# Patient Record
Sex: FEMALE | Race: WHITE | NOT HISPANIC OR LATINO | Employment: FULL TIME | URBAN - NONMETROPOLITAN AREA
[De-identification: names, ages, dates, MRNs, and addresses within clinical notes are randomized per-mention and may not be internally consistent; named-entity substitution may affect disease eponyms.]

---

## 2019-05-30 ENCOUNTER — TRANSCRIBE ORDERS (OUTPATIENT)
Dept: ADMINISTRATIVE | Facility: HOSPITAL | Age: 34
End: 2019-05-30

## 2019-05-30 DIAGNOSIS — R00.2 PALPITATIONS: ICD-10-CM

## 2019-05-30 DIAGNOSIS — Z82.41 FAMILY HISTORY OF SUDDEN CARDIAC DEATH (SCD): Primary | ICD-10-CM

## 2020-06-09 ENCOUNTER — TELEPHONE (OUTPATIENT)
Dept: OTOLARYNGOLOGY | Facility: CLINIC | Age: 35
End: 2020-06-09

## 2020-07-16 ENCOUNTER — TRANSCRIBE ORDERS (OUTPATIENT)
Dept: ADMINISTRATIVE | Facility: HOSPITAL | Age: 35
End: 2020-07-16

## 2020-07-16 DIAGNOSIS — Z01.818 PREOP TESTING: Primary | ICD-10-CM

## 2020-08-26 ENCOUNTER — TRANSCRIBE ORDERS (OUTPATIENT)
Dept: ADMINISTRATIVE | Facility: HOSPITAL | Age: 35
End: 2020-08-26

## 2020-08-28 ENCOUNTER — OFFICE VISIT (OUTPATIENT)
Age: 35
End: 2020-08-28

## 2020-08-28 VITALS — OXYGEN SATURATION: 98 % | HEART RATE: 109 BPM | TEMPERATURE: 97.4 F

## 2020-08-31 LAB — SARS-COV-2, NAA: NOT DETECTED

## 2020-09-02 NOTE — PROGRESS NOTES
YOB: 1985  Location: [facility]  Location Address: [address] [place] [address], [facility], [address] [place] [address]  Location Phone: [phone]    Chief Complaint   Patient presents with   • Breathing Difficulty     trouble breathing out her nose       History of Present Illness  Katie France is a 34 y.o. female.  Katie France is here for evaluation of ENT complaints. The patient has had problems with fatigue, nasal congestion and sinus pressure, snoring  The symptoms are localized to the bilateral nose. The patient has had moderate to severe symptoms. The symptoms have been present for the last several years The symptoms are aggravated by  no identifiable factors. The symptoms are improved by no identifiable factors.    The patient has tried nasal sprays in the past to include Flonase and astelin without any improvement of symptoms.       Past Medical History:   Diagnosis Date   • Anxiety    • Breathing difficult    • Headache    • Migraines    • Snoring        Past Surgical History:   Procedure Laterality Date   • CHOLECYSTECTOMY         Outpatient Medications Marked as Taking for the 9/3/20 encounter (Office Visit) with Ahmet Kasper MD   Medication Sig Dispense Refill   • buPROPion (WELLBUTRIN) 75 MG tablet Take 75 mg by mouth 2 (Two) Times a Day.         Amoxicillin    Family History   Problem Relation Age of Onset   • Heart attack Mother    • Hypertension Mother    • Heart attack Father        Social History     Socioeconomic History   • Marital status:      Spouse name: Not on file   • Number of children: Not on file   • Years of education: Not on file   • Highest education level: Not on file   Tobacco Use   • Smoking status: Never Smoker   • Smokeless tobacco: Never Used   Substance and Sexual Activity   • Alcohol use: No   • Drug use: No       Review of Systems   Constitutional: Positive for fatigue. Negative for activity change, appetite change, chills, diaphoresis, fever and  unexpected weight change.   HENT: Positive for congestion. Negative for dental problem, drooling, ear discharge, ear pain, facial swelling, hearing loss, mouth sores, nosebleeds, postnasal drip, rhinorrhea, sinus pressure, sneezing, sore throat, tinnitus, trouble swallowing and voice change.    Eyes: Negative.    Respiratory: Negative.         Snoring   Cardiovascular: Negative.    Gastrointestinal: Negative.    Endocrine: Negative.    Skin: Negative.    Allergic/Immunologic: Negative for environmental allergies, food allergies and immunocompromised state.   Neurological: Negative.    Hematological: Negative.    Psychiatric/Behavioral: Negative.        Vitals:    09/03/20 1341   BP: 123/77   Pulse: (!) 155   Temp: 98 °F (36.7 °C)       Body mass index is 25.49 kg/m².    Objective     Physical Exam  Physical Exam  CONSTITUTIONAL: well nourished, alert, oriented, in no acute distress      COMMUNICATION AND VOICE: able to communicate normally, normal voice quality     HEAD: normocephalic, no lesions, atraumatic, no tenderness, no masses      FACE: appearance normal, no lesions, no tenderness, no deformities, facial motion symmetric     SALIVARY GLANDS: parotid glands with no tenderness, no swelling, no masses, submandibular glands with normal size, nontender     EYES: ocular motility normal, eyelids normal, orbits normal, no proptosis, conjunctiva normal , pupils equal, round      EARS:  Hearing: response to conversational voice normal bilaterally   External Ears: auricles without lesions  Otoscopic: tympanic membrane appearance normal, no lesions, no perforation, normal mobility, no fluid     NOSE:  External Nose: structure normal, no tenderness on palpation, no nasal discharge, no lesions, no evidence of trauma, nostrils patent   Intranasal Exam: nasal mucosa edema and erythema, vestibule within normal limits, inferior turbinate hypertrophy, nasal septum deviated    ORAL:  Lips: upper and lower lips without lesion    Teeth: dentition within normal limits for age   Gums: gingivae healthy   Oral Mucosa: oral mucosa normal, no mucosal lesions   Floor of Mouth: Warthin’s duct patent, mucosa normal  Tongue: lingual mucosa normal without lesions, normal tongue mobility   Palate: soft and hard palates with normal mucosa and structure  Oropharynx: oropharyngeal mucosa normal     NECK: neck appearance normal, no mass,  noted without erythema or tenderness     THYROID: no overt thyromegaly, no tenderness, nodules or mass present on palpation, position midline      LYMPH NODES: no lymphadenopathy     CHEST/RESPIRATORY: respiratory effort normaL     CARDIOVASCULAR: extremities without cyanosis or edema       NEUROLOGIC/PSYCHIATRIC: oriented to time, place and person, mood normal, affect appropriate, CN II-XII intact grossly    Assessment/Plan   Problems Addressed this Visit        Respiratory    Deviated nasal septum    Relevant Orders    CT sinus wo contrast    Hypertrophy of both inferior nasal turbinates    Relevant Orders    CT sinus wo contrast    Snoring    Relevant Orders    CT sinus wo contrast    Chronic nasal congestion    Relevant Orders    CT sinus wo contrast    Chronic sinusitis    Relevant Orders    CT sinus wo contrast       Other    Other fatigue - Primary    Relevant Orders    TSH    Thyroid Peroxidase Antibody    T4, Free    T3, Free    Vitamin B12 & Folate    Vitamin D 1,25 Dihydroxy    Iron Profile        * Surgery not found *  Orders Placed This Encounter   Procedures   • CT sinus wo contrast     Standing Status:   Future     Standing Expiration Date:   9/3/2021     Order Specific Question:   Patient Pregnant     Answer:   No     Order Specific Question:   Is this exam for pre-operative purposes?     Answer:   Yes     Comments:   stealth protocol for image guidance   • TSH   • Thyroid Peroxidase Antibody   • T4, Free   • T3, Free   • Vitamin B12 & Folate   • Vitamin D 1,25 Dihydroxy   • Iron Profile     Return in  about 4 weeks (around 10/1/2020) for Recheck SINUSES AFTER CT.       Patient Instructions   Will get laboratory work for fatigue and get sinus CT. Recheck in 4 weeks.

## 2020-09-03 ENCOUNTER — OFFICE VISIT (OUTPATIENT)
Dept: OTOLARYNGOLOGY | Facility: CLINIC | Age: 35
End: 2020-09-03

## 2020-09-03 VITALS
WEIGHT: 148.5 LBS | DIASTOLIC BLOOD PRESSURE: 77 MMHG | SYSTOLIC BLOOD PRESSURE: 123 MMHG | TEMPERATURE: 98 F | HEIGHT: 64 IN | HEART RATE: 155 BPM | BODY MASS INDEX: 25.35 KG/M2

## 2020-09-03 DIAGNOSIS — J34.3 HYPERTROPHY OF BOTH INFERIOR NASAL TURBINATES: ICD-10-CM

## 2020-09-03 DIAGNOSIS — J32.9 CHRONIC SINUSITIS, UNSPECIFIED LOCATION: ICD-10-CM

## 2020-09-03 DIAGNOSIS — R06.83 SNORING: ICD-10-CM

## 2020-09-03 DIAGNOSIS — J34.2 DEVIATED NASAL SEPTUM: ICD-10-CM

## 2020-09-03 DIAGNOSIS — R09.81 CHRONIC NASAL CONGESTION: ICD-10-CM

## 2020-09-03 DIAGNOSIS — R53.83 OTHER FATIGUE: Primary | ICD-10-CM

## 2020-09-03 PROCEDURE — 99214 OFFICE O/P EST MOD 30 MIN: CPT | Performed by: PHYSICIAN ASSISTANT

## 2020-09-03 RX ORDER — BUPROPION HYDROCHLORIDE 75 MG/1
75 TABLET ORAL 2 TIMES DAILY
COMMUNITY
Start: 2020-07-10 | End: 2020-10-13

## 2020-09-04 ENCOUNTER — LAB (OUTPATIENT)
Dept: LAB | Facility: HOSPITAL | Age: 35
End: 2020-09-04

## 2020-09-04 LAB
FOLATE SERPL-MCNC: 12.1 NG/ML (ref 4.78–24.2)
IRON 24H UR-MRATE: 92 MCG/DL (ref 37–145)
IRON SATN MFR SERPL: 26 % (ref 20–50)
T3FREE SERPL-MCNC: 2.98 PG/ML (ref 2–4.4)
T4 FREE SERPL-MCNC: 0.9 NG/DL (ref 0.93–1.7)
TIBC SERPL-MCNC: 359 MCG/DL (ref 298–536)
TRANSFERRIN SERPL-MCNC: 241 MG/DL (ref 200–360)
TSH SERPL DL<=0.05 MIU/L-ACNC: 3.36 UIU/ML (ref 0.27–4.2)
VIT B12 BLD-MCNC: 359 PG/ML (ref 211–946)

## 2020-09-04 PROCEDURE — 84439 ASSAY OF FREE THYROXINE: CPT | Performed by: PHYSICIAN ASSISTANT

## 2020-09-04 PROCEDURE — 84443 ASSAY THYROID STIM HORMONE: CPT | Performed by: PHYSICIAN ASSISTANT

## 2020-09-04 PROCEDURE — 82746 ASSAY OF FOLIC ACID SERUM: CPT | Performed by: PHYSICIAN ASSISTANT

## 2020-09-04 PROCEDURE — 86376 MICROSOMAL ANTIBODY EACH: CPT | Performed by: PHYSICIAN ASSISTANT

## 2020-09-04 PROCEDURE — 83540 ASSAY OF IRON: CPT | Performed by: PHYSICIAN ASSISTANT

## 2020-09-04 PROCEDURE — 82607 VITAMIN B-12: CPT | Performed by: PHYSICIAN ASSISTANT

## 2020-09-04 PROCEDURE — 84466 ASSAY OF TRANSFERRIN: CPT | Performed by: PHYSICIAN ASSISTANT

## 2020-09-04 PROCEDURE — 36415 COLL VENOUS BLD VENIPUNCTURE: CPT | Performed by: OTOLARYNGOLOGY

## 2020-09-04 PROCEDURE — 82652 VIT D 1 25-DIHYDROXY: CPT | Performed by: PHYSICIAN ASSISTANT

## 2020-09-04 PROCEDURE — 84481 FREE ASSAY (FT-3): CPT | Performed by: PHYSICIAN ASSISTANT

## 2020-09-05 LAB — THYROPEROXIDASE AB SERPL-ACNC: 146 IU/ML (ref 0–34)

## 2020-09-08 LAB — 1,25(OH)2D3 SERPL-MCNC: 51.6 PG/ML (ref 19.9–79.3)

## 2020-09-09 ENCOUNTER — TELEPHONE (OUTPATIENT)
Dept: OTOLARYNGOLOGY | Facility: CLINIC | Age: 35
End: 2020-09-09

## 2020-09-10 NOTE — TELEPHONE ENCOUNTER
----- Message from MUKESH Rodriguez sent at 9/8/2020  7:22 PM CDT -----  Please call the patient regarding her abnormal result. Thyroid hormone levels normal, vitamin D normal, and iron levels are normal. Thyroid peroxidase levels are elevated indicating thyroiditis. Recommend a clean diet fresh meats, fruits, and vegetables, avoid sugar and processed foods.  
Patient notified  
no

## 2020-10-05 ENCOUNTER — HOSPITAL ENCOUNTER (OUTPATIENT)
Dept: CT IMAGING | Facility: HOSPITAL | Age: 35
Discharge: HOME | End: 2020-10-05
Admitting: PHYSICIAN ASSISTANT

## 2020-10-05 DIAGNOSIS — R06.83 SNORING: ICD-10-CM

## 2020-10-05 DIAGNOSIS — J34.3 HYPERTROPHY OF BOTH INFERIOR NASAL TURBINATES: ICD-10-CM

## 2020-10-05 DIAGNOSIS — J32.9 CHRONIC SINUSITIS, UNSPECIFIED LOCATION: ICD-10-CM

## 2020-10-05 DIAGNOSIS — R09.81 CHRONIC NASAL CONGESTION: ICD-10-CM

## 2020-10-05 DIAGNOSIS — J34.2 DEVIATED NASAL SEPTUM: ICD-10-CM

## 2020-10-05 PROCEDURE — 70486 CT MAXILLOFACIAL W/O DYE: CPT

## 2020-10-06 ENCOUNTER — TRANSCRIBE ORDERS (OUTPATIENT)
Dept: ADMINISTRATIVE | Facility: HOSPITAL | Age: 35
End: 2020-10-06

## 2020-10-12 NOTE — PROGRESS NOTES
YOB: 1985  Location: [facility]  Location Address: [address] [place] [address], [facility] [address], [address] [place] [address]  Location Phone: [phone]    Chief Complaint   Patient presents with   • Follow-up       History of Present Illness  Katie France is a 34 y.o. female.  Katie France is here for evaluation of ENT complaints. The patient has had thyroid and sinus problems. Patient has fatigue, weight lady, dysphagia, frequent throat clearing, intolerance to cold, increased thirst, increased urination and hair loss. Patient's sinus complaints include nasal congestion, postnasal drip, headache and orbital sinus pressure. The patient has had moderate to severe symptoms. The symptoms have been present for the last several years The symptoms are aggravated by no identifiable factors. The symptoms are improved by no identifiable factors. The patient has tried nasal sprays in the past to include Flonase and astelin without any improvement of symptoms. She has had thyroid labs and CT sinus.    I have personally reviewed the information imported into the chart during this visit.      I have personally reviewed the review of systems.          9/3/20  TSH   0.270 - 4.200 uIU/mL 3.360      T3, Free   2.00 - 4.40 pg/mL 2.98      Thyroid Peroxidase Antibody   0 - 34 IU/mL 146High       Free T4   0.93 - 1.70 ng/dL 0.90Low            Study Result    EXAMINATION:  CT SINUS WO CONTRAST-  10/5/2020 4:53 PM CDT     HISTORY: Sinusitis, recurring, possible surgery. Sinusitis greater than  6 weeks. Deviated septum. Turbinate hypertrophy. Chronic congestion.      COMPARISON: No comparison study.     TECHNIQUE: Spiral CT was performed of the paranasal sinuses. Sagittal  and coronal images were reconstructed. DLP: 386 mGy-cm.     FINDINGS: The paranasal sinuses are clear with no significant mucosal  thickening. There is no fluid within the paranasal sinuses. There is  deviation of the nasal septum to the left with a nasal spur on the  left.  There is no visible nasal cavity mass. The ostiomeatal complex region is  patent.     IMPRESSION:  1. No CT evidence of sinusitis.  2. Ostiomeatal complex is patent.  3. Deviation of the nasal septum to the left. There is a nasal spur on  the septum on the left side.  This report was finalized on 10/05/2020 17:34 by Dr. David Nicole MD.              Past Medical History:   Diagnosis Date   • Anxiety    • Breathing difficult    • Headache    • Migraines    • Snoring        Past Surgical History:   Procedure Laterality Date   • CHOLECYSTECTOMY         Outpatient Medications Marked as Taking for the 10/13/20 encounter (Office Visit) with Ahmet Kasper MD   Medication Sig Dispense Refill   • buPROPion (WELLBUTRIN) 100 MG tablet Take 100 mg by mouth 2 (Two) Times a Day.     • SUMAtriptan (IMITREX) 50 MG tablet TAKE 1 TABLET AT LEAST 2 HOURS BETWEEN DOSES AS NEEDED TWICE A DAY         Amoxicillin    Family History   Problem Relation Age of Onset   • Heart attack Mother    • Hypertension Mother    • Heart attack Father        Social History     Socioeconomic History   • Marital status:      Spouse name: Not on file   • Number of children: Not on file   • Years of education: Not on file   • Highest education level: Not on file   Tobacco Use   • Smoking status: Never Smoker   • Smokeless tobacco: Never Used   Substance and Sexual Activity   • Alcohol use: No   • Drug use: No       Review of Systems   Constitutional: Positive for fever and unexpected weight change.   HENT: Positive for congestion, postnasal drip, sinus pressure and trouble swallowing.    Eyes: Negative.    Respiratory: Negative.    Cardiovascular: Negative.    Gastrointestinal: Negative.    Endocrine: Positive for cold intolerance, polydipsia and polyuria.   Musculoskeletal: Negative.    Skin: Negative.    Allergic/Immunologic: Negative.    Neurological: Positive for headaches.   Hematological: Negative.    Psychiatric/Behavioral:  Positive for sleep disturbance.       Vitals:    10/13/20 1516   BP: 122/73   Pulse: 79   Temp: 98.4 °F (36.9 °C)       Body mass index is 25.75 kg/m².    Objective     Physical Exam  Physical Exam  CONSTITUTIONAL: well nourished, alert, oriented, in no acute distress      COMMUNICATION AND VOICE: able to communicate normally, normal voice quality     HEAD: normocephalic, no lesions, atraumatic, no tenderness, no masses      FACE: appearance normal, no lesions, no tenderness, no deformities, facial motion symmetric     SALIVARY GLANDS: parotid glands with no tenderness, no swelling, no masses, submandibular glands with normal size, nontender     EYES: ocular motility normal, eyelids normal, orbits normal, no proptosis, conjunctiva normal , pupils equal, round      EARS:  Hearing: response to conversational voice normal bilaterally   External Ears: auricles without lesions  Otoscopic: tympanic membrane appearance normal, no lesions, no perforation, normal mobility, no fluid     NOSE:  External Nose: structure normal, no tenderness on palpation, no nasal discharge, no lesions, no evidence of trauma, nostrils patent   Intranasal Exam: nasal mucosa normal, vestibule within normal limits, inferior turbinate normal, nasal septum midline   Nasopharynx:      ORAL:  Lips: upper and lower lips without lesion   Teeth: dentition within normal limits for age   Gums: gingivae healthy   Oral Mucosa: oral mucosa normal, no mucosal lesions   Floor of Mouth: Warthin’s duct patent, mucosa normal  Tongue: lingual mucosa normal without lesions, normal tongue mobility   Palate: soft and hard palates with normal mucosa and structure  Oropharynx: oropharyngeal mucosa normal     HYPOPHARYNX:   LARYNX:       NECK: neck appearance normal, no mass,  noted without erythema or tenderness     THYROID: no overt thyromegaly, no tenderness, nodules or mass present on palpation, position midline      LYMPH NODES: no lymphadenopathy    "  CHEST/RESPIRATORY: respiratory effort normaL     CARDIOVASCULAR: extremities without cyanosis or edema       NEUROLOGIC/PSYCHIATRIC: oriented to time, place and person, mood normal, affect appropriate, CN II-XII intact grossly    Assessment/Plan   Diagnoses and all orders for this visit:    1. Deviated nasal septum (Primary)    2. Hypertrophy of both inferior nasal turbinates    3. Chronic maxillary sinusitis    Other orders  -     azelastine (ASTELIN) 0.1 % nasal spray; 2 sprays into the nostril(s) as directed by provider 2 (Two) Times a Day for 30 days. Use in each nostril as directed  Dispense: 30 mL; Refill: 6  -     fluticasone (Flonase) 50 MCG/ACT nasal spray; 2 sprays into the nostril(s) as directed by provider Daily for 30 days. Administer 2 sprays in each nostril for each dose.  Dispense: 1 bottle; Refill: 6      * Surgery not found *  No orders of the defined types were placed in this encounter.    Return in about 8 weeks (around 12/8/2020).       Patient Instructions   Patient complains of intestinal bloating and other difficulties and knows she is \"having trouble with milk and dairy\".  Recommendation was made to discontinue milk and dairy  We will add Astelin and Flonase    Consider whole food plant-based diet and/or juice plus  Follow-up in 6 to 8 weeks with consideration for septoplasty, inferior turbinate reduction and bilateral maxillary antrostomies if no improvement noted-she is chosen a tentative date of January 8    "

## 2020-10-13 ENCOUNTER — OFFICE VISIT (OUTPATIENT)
Dept: OTOLARYNGOLOGY | Facility: CLINIC | Age: 35
End: 2020-10-13

## 2020-10-13 VITALS
DIASTOLIC BLOOD PRESSURE: 73 MMHG | TEMPERATURE: 98.4 F | HEART RATE: 79 BPM | BODY MASS INDEX: 25.61 KG/M2 | WEIGHT: 150 LBS | SYSTOLIC BLOOD PRESSURE: 122 MMHG | HEIGHT: 64 IN

## 2020-10-13 DIAGNOSIS — J34.3 HYPERTROPHY OF BOTH INFERIOR NASAL TURBINATES: ICD-10-CM

## 2020-10-13 DIAGNOSIS — J32.0 CHRONIC MAXILLARY SINUSITIS: ICD-10-CM

## 2020-10-13 DIAGNOSIS — J34.2 DEVIATED NASAL SEPTUM: Primary | ICD-10-CM

## 2020-10-13 PROCEDURE — 31231 NASAL ENDOSCOPY DX: CPT | Performed by: OTOLARYNGOLOGY

## 2020-10-13 PROCEDURE — 99214 OFFICE O/P EST MOD 30 MIN: CPT | Performed by: OTOLARYNGOLOGY

## 2020-10-13 RX ORDER — BUPROPION HYDROCHLORIDE 100 MG/1
100 TABLET ORAL 2 TIMES DAILY
COMMUNITY
Start: 2020-09-21 | End: 2020-12-15

## 2020-10-13 RX ORDER — SUMATRIPTAN 50 MG/1
TABLET, FILM COATED ORAL
COMMUNITY
Start: 2020-09-21 | End: 2022-11-18

## 2020-10-13 RX ORDER — FLUTICASONE PROPIONATE 50 MCG
2 SPRAY, SUSPENSION (ML) NASAL DAILY
Qty: 1 BOTTLE | Refills: 6 | Status: SHIPPED | OUTPATIENT
Start: 2020-10-13 | End: 2021-01-08 | Stop reason: HOSPADM

## 2020-10-13 RX ORDER — AZELASTINE 1 MG/ML
2 SPRAY, METERED NASAL 2 TIMES DAILY
Qty: 30 ML | Refills: 6 | Status: SHIPPED | OUTPATIENT
Start: 2020-10-13 | End: 2020-11-12

## 2020-10-13 NOTE — PROGRESS NOTES
PROCEDURE NOTE    Katie France    DATE OF PROCEDURE: 10/13/2020    PROCEDURE:   Bilateral Diagnostic Rigid Nasal Endoscopy    PREPROCEDURE DIAGNOSIS:   Chronic headaches associated with frontal pressure and persistent congestion    POSTPROCEDURE DIAGNOSIS:  SAME    ANESTHESIA:   None    PROCEDURE DESCRIPTION:    With the patient in the chair bilateral diagnostic rigid nasal endoscopy was performed with the Stortz 0° endoscope without difficulty.     An endoscope was passed along the left nasal floor to the nasopharynx.  It was then passed into the region of the middle meatus, middle turbinate, and the sphenoethmoid region. An identical procedure was performed on the right side.  The following findings were noted as stated below:    Findings: Left moderate to severe septal deviation with a massive spur obstructing the left meatus and lateralizing the left middle turbinate.  Bilateral inferior turbinate hypertrophy was noted with an inferior spur on the right.  No intranasal polyposis is appreciated bilaterally    Condition:  Stable.  Patient tolerated procedure well.    Complications:  None  There was no significant bleeding.

## 2020-10-13 NOTE — PATIENT INSTRUCTIONS
"Patient complains of intestinal bloating and other difficulties and knows she is \"having trouble with milk and dairy\".  Recommendation was made to discontinue milk and dairy  We will add Astelin and Flonase    Consider whole food plant-based diet and/or juice plus  Follow-up in 6 to 8 weeks with consideration for septoplasty, inferior turbinate reduction and bilateral maxillary antrostomies if no improvement noted-she is chosen a tentative date of January 8  "

## 2020-12-09 ENCOUNTER — TRANSCRIBE ORDERS (OUTPATIENT)
Dept: ADMINISTRATIVE | Facility: HOSPITAL | Age: 35
End: 2020-12-09

## 2020-12-09 DIAGNOSIS — Z01.818 PRE-OP TESTING: Primary | ICD-10-CM

## 2020-12-11 ENCOUNTER — LAB (OUTPATIENT)
Dept: LAB | Facility: HOSPITAL | Age: 35
End: 2020-12-11

## 2020-12-11 DIAGNOSIS — Z01.818 PREOP TESTING: Primary | ICD-10-CM

## 2020-12-11 PROCEDURE — U0004 COV-19 TEST NON-CDC HGH THRU: HCPCS

## 2020-12-11 PROCEDURE — C9803 HOPD COVID-19 SPEC COLLECT: HCPCS

## 2020-12-13 LAB — SARS-COV-2 ORF1AB RESP QL NAA+PROBE: NOT DETECTED

## 2020-12-14 NOTE — PROGRESS NOTES
YOB: 1985  Location: [facility]  Location Address: [address] [place] [address], [facility], [address] [place] [address]  Location Phone: [phone]    Chief Complaint   Patient presents with   • Follow-up     no change       History of Present Illness   Katie France is a 34 y.o. female.  Katie France is here for evaluation of ENT complaints. The patient has had thyroid and sinus problems. Patient has fatigue, weight gain, dysphagia, frequent throat clearing, intolerance to cold, increased thirst, increased urination and hair loss. Patient's sinus complaints include nasal congestion, postnasal drip, headache and orbital sinus pressure. The patient has had moderate to severe symptoms. The symptoms have been present for the last several years The symptoms are aggravated by no identifiable factors. The symptoms are improved by no identifiable factors. The patient has tried nasal sprays in the past to include Flonase and astelin without any improvement of symptoms. She states she is not the most compliant with her medications. She has had thyroid labs and CT sinus.    I have personally reviewed the information imported into the chart during this visit.      I have personally reviewed the review of systems.              9/3/20  TSH   0.270 - 4.200 uIU/mL 3.360       T3, Free   2.00 - 4.40 pg/mL 2.98       Thyroid Peroxidase Antibody   0 - 34 IU/mL 146High        Free T4   0.93 - 1.70 ng/dL 0.90Low               Study Result     EXAMINATION:  CT SINUS WO CONTRAST-  10/5/2020 4:53 PM CDT     HISTORY: Sinusitis, recurring, possible surgery. Sinusitis greater than  6 weeks. Deviated septum. Turbinate hypertrophy. Chronic congestion.      COMPARISON: No comparison study.     TECHNIQUE: Spiral CT was performed of the paranasal sinuses. Sagittal  and coronal images were reconstructed. DLP: 386 mGy-cm.     FINDINGS: The paranasal sinuses are clear with no significant mucosal  thickening. There is no fluid within the  paranasal sinuses. There is  deviation of the nasal septum to the left with a nasal spur on the left.  There is no visible nasal cavity mass. The ostiomeatal complex region is  patent.     IMPRESSION:  1. No CT evidence of sinusitis.  2. Ostiomeatal complex is patent.  3. Deviation of the nasal septum to the left. There is a nasal spur on  the septum on the left side.  This report was finalized on 10/05/2020 17:34 by Dr. David Nicole MD.                            Past Medical History:   Diagnosis Date   • Anxiety    • Breathing difficult    • Headache    • Migraines    • Snoring        Past Surgical History:   Procedure Laterality Date   • CHOLECYSTECTOMY         Outpatient Medications Marked as Taking for the 12/15/20 encounter (Office Visit) with Ahmet Kasper MD   Medication Sig Dispense Refill   • escitalopram (LEXAPRO) 10 MG tablet Take 10 mg by mouth Daily.     • levothyroxine (SYNTHROID, LEVOTHROID) 25 MCG tablet      • SUMAtriptan (IMITREX) 50 MG tablet TAKE 1 TABLET AT LEAST 2 HOURS BETWEEN DOSES AS NEEDED TWICE A DAY         Amoxicillin    Family History   Problem Relation Age of Onset   • Heart attack Mother    • Hypertension Mother    • Heart attack Father        Social History     Socioeconomic History   • Marital status:      Spouse name: Not on file   • Number of children: Not on file   • Years of education: Not on file   • Highest education level: Not on file   Tobacco Use   • Smoking status: Never Smoker   • Smokeless tobacco: Never Used   Substance and Sexual Activity   • Alcohol use: No   • Drug use: No       Review of Systems   Constitutional: Positive for fatigue and unexpected weight change.   HENT: Positive for postnasal drip, sinus pressure, trouble swallowing and voice change.         Frequent throat clearing   Eyes: Negative.    Respiratory: Negative.    Cardiovascular: Negative.    Gastrointestinal: Negative.    Endocrine: Positive for cold intolerance and polydipsia.    Genitourinary: Negative.    Musculoskeletal: Negative.    Skin:        Hair loss   Allergic/Immunologic: Negative.    Neurological: Positive for headaches.   Hematological: Negative.    Psychiatric/Behavioral: Negative.        Vitals:    12/15/20 1330   BP: 113/70   Pulse: 71   Temp: 97.8 °F (36.6 °C)       There is no height or weight on file to calculate BMI.    Objective     Physical Exam  CONSTITUTIONAL: well nourished, well-developed, alert, oriented, in no acute distress     COMMUNICATION AND VOICE: able to communicate normally, normal voice quality    HEAD: normocephalic, no lesions, atraumatic, no tenderness, no masses     FACE: appearance normal, no lesions, no tenderness, no deformities, facial motion symmetric    EYES: ocular motility normal, eyelids normal, orbits normal, no proptosis, conjunctiva normal , pupils equal, round     EARS:  Hearing: hearing to conversational voice intact bilaterally   External Ears: normal bilaterally, no lesions    NOSE:  External Nose: external nasal structure normal, no tenderness on palpation, no nasal discharge, no lesions, no evidence of trauma, nostrils patent   Intranasal exam: Severe left septal deflection with spurring impacting superior portion of the left middle turbinate thickened secretions from the left middle meatus.  On the right inferior septal spurring is noted along the floor the right nasal cavity.    ORAL:  Lips: upper and lower lips without lesion     NECK:  Inspection and Palpation: neck appearance normal, no masses or tenderness    CHEST/RESPIRATORY: normal respiratory effort     CARDIOVASCULAR: no cyanosis or edema     NEUROLOGICAL/PSYCHIATRIC: oriented to time, place and person, mood normal, affect appropriate, CN II-XII intact grossly      Assessment/Plan   Diagnoses and all orders for this visit:    1. Deviated nasal septum (Primary)  -     Case Request; Standing  -     Comprehensive Metabolic Panel; Future  -     CBC (No Diff); Future  -      oxymetazoline (AFRIN) nasal spray 2 spray  -     Case Request    2. Hypertrophy of both inferior nasal turbinates  -     Case Request; Standing  -     Comprehensive Metabolic Panel; Future  -     CBC (No Diff); Future  -     oxymetazoline (AFRIN) nasal spray 2 spray  -     Case Request    3. Chronic maxillary sinusitis  -     Case Request; Standing  -     Comprehensive Metabolic Panel; Future  -     CBC (No Diff); Future  -     oxymetazoline (AFRIN) nasal spray 2 spray  -     Case Request    4. Farhana bullosa  Comments:  Bilateral  Orders:  -     Case Request; Standing  -     Comprehensive Metabolic Panel; Future  -     CBC (No Diff); Future  -     oxymetazoline (AFRIN) nasal spray 2 spray  -     Case Request    Other orders  -     Follow Anesthesia Guidelines / Standing Orders; Future  -     Obtain Informed Consent  -     Follow Anesthesia Guidelines / Standing Orders; Standing  -     Verify NPO Status; Standing  -     NPO Diet; Standing  -     Obtain Informed Consent; Standing  -     SCD (Sequential Compression Device) - To Be Placed on Patient in Pre-Op; Standing      Septoplasty, bilateral inferior turbinate reduction via Coblation, resection of bilateral farhana bullosa deformities via rigid nasal endoscopy (N/A)  Orders Placed This Encounter   Procedures   • Comprehensive Metabolic Panel     Standing Status:   Future     Standing Expiration Date:   12/15/2021   • CBC (No Diff)     Standing Status:   Future     Standing Expiration Date:   12/15/2021   • Follow Anesthesia Guidelines / Standing Orders     Standing Status:   Future   • Obtain Informed Consent     Order Specific Question:   Informed Consent Given For     Answer:   ENDOSCOPIC FUNCTIONAL SINUS SURGERY W TRACKING, SINUPLASTY, SEPTOPLASTY, RESECT INFERIOR TURBINATES VIA COBLATION     Return for postop.       Patient Instructions   SEPTOPLASTY AND TURBINOPLASTY WITH FARHANA RESECTION< RIGID NASAL ENDOSCOPY: A septoplasty and inferior turbinoplasty were  recommended. The risks and benefits were explained including but not limited to pain, bleeding, infection, risks of the general anesthesia, continued septal deviation, crusting, congestion and septal perforation. Possibilities of continued preoperative symptoms and the possible need for revision surgery and or medical therapy were discussed. Alternatives were discussed. No guarantees were made or implied. Questions were asked appropriately answered.      Patient and family were instructed on the proper use of scheduled prescription drugs including their impact on driving and the potential effects during pregnancy.  The potential for overdose was discussed and their safe storage and proper disposal.  The website www.[employer].[place].gov which contains other materials in this regard.

## 2020-12-15 ENCOUNTER — OFFICE VISIT (OUTPATIENT)
Dept: OTOLARYNGOLOGY | Facility: CLINIC | Age: 35
End: 2020-12-15

## 2020-12-15 VITALS — SYSTOLIC BLOOD PRESSURE: 113 MMHG | TEMPERATURE: 97.8 F | HEART RATE: 71 BPM | DIASTOLIC BLOOD PRESSURE: 70 MMHG

## 2020-12-15 DIAGNOSIS — J34.2 DEVIATED NASAL SEPTUM: Primary | ICD-10-CM

## 2020-12-15 DIAGNOSIS — J34.3 HYPERTROPHY OF BOTH INFERIOR NASAL TURBINATES: ICD-10-CM

## 2020-12-15 DIAGNOSIS — J32.0 CHRONIC MAXILLARY SINUSITIS: ICD-10-CM

## 2020-12-15 DIAGNOSIS — J34.89 CONCHA BULLOSA: ICD-10-CM

## 2020-12-15 PROCEDURE — 99214 OFFICE O/P EST MOD 30 MIN: CPT | Performed by: OTOLARYNGOLOGY

## 2020-12-15 RX ORDER — OXYMETAZOLINE HYDROCHLORIDE 0.05 G/100ML
2 SPRAY NASAL
Status: CANCELLED | OUTPATIENT
Start: 2020-12-15 | End: 2020-12-18

## 2020-12-15 RX ORDER — LEVOTHYROXINE SODIUM 0.03 MG/1
25 TABLET ORAL DAILY
COMMUNITY
Start: 2020-12-13 | End: 2021-10-05

## 2020-12-15 RX ORDER — ESCITALOPRAM OXALATE 10 MG/1
10 TABLET ORAL DAILY
COMMUNITY
Start: 2020-11-17 | End: 2022-11-18

## 2020-12-15 NOTE — PATIENT INSTRUCTIONS
SEPTOPLASTY AND TURBINOPLASTY WITH FARHANA RESECTION< RIGID NASAL ENDOSCOPY: A septoplasty and inferior turbinoplasty were recommended. The risks and benefits were explained including but not limited to pain, bleeding, infection, risks of the general anesthesia, continued septal deviation, crusting, congestion and septal perforation. Possibilities of continued preoperative symptoms and the possible need for revision surgery and or medical therapy were discussed. Alternatives were discussed. No guarantees were made or implied. Questions were asked appropriately answered.      Patient and family were instructed on the proper use of scheduled prescription drugs including their impact on driving and the potential effects during pregnancy.  The potential for overdose was discussed and their safe storage and proper disposal.  The website www.[employer].[place].gov which contains other materials in this regard.

## 2020-12-31 ENCOUNTER — TRANSCRIBE ORDERS (OUTPATIENT)
Dept: ADMINISTRATIVE | Facility: HOSPITAL | Age: 35
End: 2020-12-31

## 2020-12-31 DIAGNOSIS — Z11.59 SCREENING FOR VIRAL DISEASE: Primary | ICD-10-CM

## 2021-01-04 ENCOUNTER — APPOINTMENT (OUTPATIENT)
Dept: PREADMISSION TESTING | Facility: HOSPITAL | Age: 36
End: 2021-01-04

## 2021-01-04 VITALS
WEIGHT: 151.9 LBS | BODY MASS INDEX: 25.31 KG/M2 | DIASTOLIC BLOOD PRESSURE: 80 MMHG | SYSTOLIC BLOOD PRESSURE: 106 MMHG | OXYGEN SATURATION: 99 % | RESPIRATION RATE: 16 BRPM | HEART RATE: 64 BPM | HEIGHT: 65 IN

## 2021-01-04 DIAGNOSIS — J34.89 CONCHA BULLOSA: ICD-10-CM

## 2021-01-04 DIAGNOSIS — J34.2 DEVIATED NASAL SEPTUM: ICD-10-CM

## 2021-01-04 DIAGNOSIS — J32.0 CHRONIC MAXILLARY SINUSITIS: ICD-10-CM

## 2021-01-04 DIAGNOSIS — J34.3 HYPERTROPHY OF BOTH INFERIOR NASAL TURBINATES: ICD-10-CM

## 2021-01-04 LAB
ALBUMIN SERPL-MCNC: 4.7 G/DL (ref 3.5–5.2)
ALBUMIN/GLOB SERPL: 1.7 G/DL
ALP SERPL-CCNC: 47 U/L (ref 39–117)
ALT SERPL W P-5'-P-CCNC: 14 U/L (ref 1–33)
ANION GAP SERPL CALCULATED.3IONS-SCNC: 8 MMOL/L (ref 5–15)
AST SERPL-CCNC: 18 U/L (ref 1–32)
BILIRUB SERPL-MCNC: 0.3 MG/DL (ref 0–1.2)
BUN SERPL-MCNC: 8 MG/DL (ref 6–20)
BUN/CREAT SERPL: 11.9 (ref 7–25)
CALCIUM SPEC-SCNC: 9.2 MG/DL (ref 8.6–10.5)
CHLORIDE SERPL-SCNC: 104 MMOL/L (ref 98–107)
CO2 SERPL-SCNC: 27 MMOL/L (ref 22–29)
CREAT SERPL-MCNC: 0.67 MG/DL (ref 0.57–1)
DEPRECATED RDW RBC AUTO: 40.1 FL (ref 37–54)
ERYTHROCYTE [DISTWIDTH] IN BLOOD BY AUTOMATED COUNT: 12.1 % (ref 12.3–15.4)
GFR SERPL CREATININE-BSD FRML MDRD: 100 ML/MIN/1.73
GLOBULIN UR ELPH-MCNC: 2.8 GM/DL
GLUCOSE SERPL-MCNC: 97 MG/DL (ref 65–99)
HCT VFR BLD AUTO: 40.4 % (ref 34–46.6)
HGB BLD-MCNC: 13.6 G/DL (ref 12–15.9)
MCH RBC QN AUTO: 30.4 PG (ref 26.6–33)
MCHC RBC AUTO-ENTMCNC: 33.7 G/DL (ref 31.5–35.7)
MCV RBC AUTO: 90.4 FL (ref 79–97)
PLATELET # BLD AUTO: 213 10*3/MM3 (ref 140–450)
PMV BLD AUTO: 10.5 FL (ref 6–12)
POTASSIUM SERPL-SCNC: 4 MMOL/L (ref 3.5–5.2)
PROT SERPL-MCNC: 7.5 G/DL (ref 6–8.5)
RBC # BLD AUTO: 4.47 10*6/MM3 (ref 3.77–5.28)
SODIUM SERPL-SCNC: 139 MMOL/L (ref 136–145)
WBC # BLD AUTO: 4.88 10*3/MM3 (ref 3.4–10.8)

## 2021-01-04 PROCEDURE — 80053 COMPREHEN METABOLIC PANEL: CPT

## 2021-01-04 PROCEDURE — 36415 COLL VENOUS BLD VENIPUNCTURE: CPT

## 2021-01-04 PROCEDURE — 85027 COMPLETE CBC AUTOMATED: CPT

## 2021-01-04 NOTE — DISCHARGE INSTRUCTIONS
DAY OF SURGERY INSTRUCTIONS        YOUR SURGEON: ***    PROCEDURE: ***    DATE OF SURGERY: ***    ARRIVAL TIME: AS DIRECTED BY OFFICE    YOU MAY TAKE THE FOLLOWING MEDICATION(S) THE MORNING OF SURGERY WITH A SIP OF WATER: ***    ALL OTHER HOME MEDICATIONS CHECK WITH YOUR DOCTOR    DO NOT TAKE ANY ERECTILE DYSFUNCTION MEDICATIONS (EX:  CIALIS, VIAGRA) 24 HOURS PRIOR TO SURGERY              MANAGING PAIN AFTER SURGERY    We know you are probably wondering what your pain will be like after surgery.  Following surgery it is unrealistic to expect you will not have pain.   Pain is how our bodies let us know that something is wrong or cautions us to be careful.  That said, our goal is to make your pain tolerable.    Methods we may use to treat your pain include (oral or IV medications, PCAs, epidurals, nerve blocks, etc.)   While some procedures require IV pain medications for a short time after surgery, transitioning to pain medications by mouth allows for better management of pain.   Your nurse will encourage you to take oral pain medications whenever possible.  IV medications work almost immediately, but only last a short while.  Taking medications by mouth allows for a more constant level of medication in your blood stream for a longer period of time.      Once your pain is out of control it is harder to get back under control.  It is important you are aware when your next dose of pain medication is due.  If you are admitted, your nurse may write the time of your next dose on the white board in your room to help you remember.      We are interested in your pain and encourage you to inform us about aggravating factors during your visit.   Many times a simple repositioning every few hours can make a big difference.    If your physician says it is okay, do not let your pain prevent you from getting out of bed. Be sure to call your nurse for assistance prior to getting up so you do not fall.      Before surgery, please  decide your tolerable pain goal.  These faces help describe the pain ratings we use on a 0-10 scale.   Be prepared to tell us your goal and whether or not you take pain or anxiety medications at home.      BEFORE YOU COME TO THE HOSPITAL  (Pre-op instructions)  • Do not eat, drink, smoke or chew gum after midnight the night before surgery.  This also includes no mints.  • Morning of surgery take only the medicines you have been instructed with a sip of water unless otherwise instructed  by your physician.  • Do not shave, wear makeup or dark nail polish.  • Remove all jewelry including rings.  • Leave anything you consider valuable at home.  • Leave your suitcase in the car until after your surgery.  • Bring the following with you if applicable:  o Picture ID and insurance, Medicare or Medicaid cards  o Co-pay/deductible required by insurance (cash, check, credit card)  o Copy of advance directive, living will or power-of- documents if not brought to PAT  o CPAP or BIPAP mask and tubing  o Relaxation aids ( book, magazine), etc.  o Hearing aids                                 ON THE DAY OF SURGERY  · On the day of surgery check in at registration located at the main entrance of the hospital.   ? You will be registered and given a beeper with instructions where to wait in the main lobby.  ? When your beeper lights up and vibrates a member of the Outpatient Surgery staff will meet you at the double doors under the stair steps and escort you to your preoperative room.   · You may have cloth compression devices placed on your legs. These help to prevent blood clots and reduce swelling in your legs.  · An IV may be inserted into one of your veins.  · In the operating room, you may be given one or more of the following:  ? A medicine to help you relax (sedative).  ? A medicine to numb the area (local anesthetic).  ? A medicine to make you fall asleep (general anesthetic).  ? A medicine that is injected into an  "area of your body to numb everything below the injection site (regional anesthetic).  · Your surgical site will be marked or identified.  · You may be given an antibiotic through your IV to help prevent infection.  Contact a health care provider if you:  · Develop a fever of more than 100.4°F (38°C) or other feelings of illness during the 48 hours before your surgery.  · Have symptoms that get worse.  Have questions or concerns about your surgery    General Anesthesia/Surgery, Adult  General anesthesia is the use of medicines to make a person \"go to sleep\" (unconscious) for a medical procedure. General anesthesia must be used for certain procedures, and is often recommended for procedures that:  · Last a long time.  · Require you to be still or in an unusual position.  · Are major and can cause blood loss.  The medicines used for general anesthesia are called general anesthetics. As well as making you unconscious for a certain amount of time, these medicines:  · Prevent pain.  · Control your blood pressure.  · Relax your muscles.  Tell a health care provider about:  · Any allergies you have.  · All medicines you are taking, including vitamins, herbs, eye drops, creams, and over-the-counter medicines.  · Any problems you or family members have had with anesthetic medicines.  · Types of anesthetics you have had in the past.  · Any blood disorders you have.  · Any surgeries you have had.  · Any medical conditions you have.  · Any recent upper respiratory, chest, or ear infections.  · Any history of:  ? Heart or lung conditions, such as heart failure, sleep apnea, asthma, or chronic obstructive pulmonary disease (COPD).  ?  service.  ? Depression or anxiety.  · Any tobacco or drug use, including marijuana or alcohol use.  · Whether you are pregnant or may be pregnant.  What are the risks?  Generally, this is a safe procedure. However, problems may occur, including:  · Allergic reaction.  · Lung and heart " problems.  · Inhaling food or liquid from the stomach into the lungs (aspiration).  · Nerve injury.  · Air in the bloodstream, which can lead to stroke.  · Extreme agitation or confusion (delirium) when you wake up from the anesthetic.  · Waking up during your procedure and being unable to move. This is rare.  These problems are more likely to develop if you are having a major surgery or if you have an advanced or serious medical condition. You can prevent some of these complications by answering all of your health care provider's questions thoroughly and by following all instructions before your procedure.  General anesthesia can cause side effects, including:  · Nausea or vomiting.  · A sore throat from the breathing tube.  · Hoarseness.  · Wheezing or coughing.  · Shaking chills.  · Tiredness.  · Body aches.  · Anxiety.  · Sleepiness or drowsiness.  · Confusion or agitation.  RISKS AND COMPLICATIONS OF SURGERY  Your health care provider will discuss possible risks and complications with you before surgery. Common risks and complications include:    · Problems due to the use of anesthetics.  · Blood loss and replacement (does not apply to minor surgical procedures).  · Temporary increase in pain due to surgery.  · Uncorrected pain or problems that the surgery was meant to correct.  · Infection.  · New damage.    What happens before the procedure?    Medicines  Ask your health care provider about:  · Changing or stopping your regular medicines. This is especially important if you are taking diabetes medicines or blood thinners.  · Taking medicines such as aspirin and ibuprofen. These medicines can thin your blood. Do not take these medicines unless your health care provider tells you to take them.  · Taking over-the-counter medicines, vitamins, herbs, and supplements. Do not take these during the week before your procedure unless your health care provider approves them.  General instructions  · Starting 3-6 weeks  before the procedure, do not use any products that contain nicotine or tobacco, such as cigarettes and e-cigarettes. If you need help quitting, ask your health care provider.  · If you brush your teeth on the morning of the procedure, make sure to spit out all of the toothpaste.  · Tell your health care provider if you become ill or develop a cold, cough, or fever.  · If instructed by your health care provider, bring your sleep apnea device with you on the day of your surgery (if applicable).  · Ask your health care provider if you will be going home the same day, the following day, or after a longer hospital stay.  ? Plan to have someone take you home from the hospital or clinic.  ? Plan to have a responsible adult care for you for at least 24 hours after you leave the hospital or clinic. This is important.  What happens during the procedure?  · You will be given anesthetics through both of the following:  ? A mask placed over your nose and mouth.  ? An IV in one of your veins.  · You may receive a medicine to help you relax (sedative).  · After you are unconscious, a breathing tube may be inserted down your throat to help you breathe. This will be removed before you wake up.  · An anesthesia specialist will stay with you throughout your procedure. He or she will:  ? Keep you comfortable and safe by continuing to give you medicines and adjusting the amount of medicine that you get.  ? Monitor your blood pressure, pulse, and oxygen levels to make sure that the anesthetics do not cause any problems.  The procedure may vary among health care providers and hospitals.  What happens after the procedure?  · Your blood pressure, temperature, heart rate, breathing rate, and blood oxygen level will be monitored until the medicines you were given have worn off.  · You will wake up in a recovery area. You may wake up slowly.  · If you feel anxious or agitated, you may be given medicine to help you calm down.  · If you will be  going home the same day, your health care provider may check to make sure you can walk, drink, and urinate.  · Your health care provider will treat any pain or side effects you have before you go home.  · Do not drive for 24 hours if you were given a sedative.  Summary  · General anesthesia is used to keep you still and prevent pain during a procedure.  · It is important to tell your healthcare provider about your medical history and any surgeries you have had, and previous experience with anesthesia.  · Follow your healthcare provider’s instructions about when to stop eating, drinking, or taking certain medicines before your procedure.  · Plan to have someone take you home from the hospital or clinic.  This information is not intended to replace advice given to you by your health care provider. Make sure you discuss any questions you have with your health care provider.  Document Released: 03/26/2009 Document Revised: 08/03/2018 Document Reviewed: 08/03/2018  [employer] Interactive Patient Education © 2019 [employer]      Fall Prevention in Hospitals, Adult  As a hospital patient, your condition and the treatments you receive can increase your risk for falls. Some additional risk factors for falls in a hospital include:  · Being in an unfamiliar environment.  · Being on bed rest.  · Your surgery.  · Taking certain medicines.  · Your tubing requirements, such as intravenous (IV) therapy or catheters.  It is important that you learn how to decrease fall risks while at the hospital. Below are important tips that can help prevent falls.  SAFETY TIPS FOR PREVENTING FALLS  Talk about your risk of falling.  · Ask your health care provider why you are at risk for falling. Is it your medicine, illness, tubing placement, or something else?  · Make a plan with your health care provider to keep you safe from falls.  · Ask your health care provider or pharmacist about side effects of your medicines. Some medicines can make you  dizzy or affect your coordination.  Ask for help.  · Ask for help before getting out of bed. You may need to press your call button.  · Ask for assistance in getting safely to the toilet.  · Ask for a walker or cane to be put at your bedside. Ask that most of the side rails on your bed be placed up before your health care provider leaves the room.  · Ask family or friends to sit with you.  · Ask for things that are out of your reach, such as your glasses, hearing aids, telephone, bedside table, or call button.  Follow these tips to avoid falling:  · Stay lying or seated, rather than standing, while waiting for help.  · Wear rubber-soled slippers or shoes whenever you walk in the hospital.  · Avoid quick, sudden movements.  ¨ Change positions slowly.  ¨ Sit on the side of your bed before standing.  ¨ Stand up slowly and wait before you start to walk.  · Let your health care provider know if there is a spill on the floor.  · Pay careful attention to the medical equipment, electrical cords, and tubes around you.  · When you need help, use your call button by your bed or in the bathroom. Wait for one of your health care providers to help you.  · If you feel dizzy or unsure of your footing, return to bed and wait for assistance.  · Avoid being distracted by the TV, telephone, or another person in your room.  · Do not lean or support yourself on rolling objects, such as IV poles or bedside tables.     This information is not intended to replace advice given to you by your health care provider. Make sure you discuss any questions you have with your health care provider.     Document Released: 12/15/2001 Document Revised: 01/08/2016 Document Reviewed: 08/25/2013  [employer] Interactive Patient Education ©2016 [employer]            PATIENT/FAMILY/RESPONSIBLE PARTY VERBALIZES UNDERSTANDING OF ABOVE EDUCATION.  COPY OF PAIN SCALE GIVEN AND REVIEWED WITH VERBALIZED UNDERSTANDING.

## 2021-01-05 ENCOUNTER — LAB (OUTPATIENT)
Dept: LAB | Facility: HOSPITAL | Age: 36
End: 2021-01-05

## 2021-01-05 LAB — SARS-COV-2 ORF1AB RESP QL NAA+PROBE: NOT DETECTED

## 2021-01-05 PROCEDURE — C9803 HOPD COVID-19 SPEC COLLECT: HCPCS | Performed by: OTOLARYNGOLOGY

## 2021-01-05 PROCEDURE — U0004 COV-19 TEST NON-CDC HGH THRU: HCPCS | Performed by: OTOLARYNGOLOGY

## 2021-01-08 ENCOUNTER — HOSPITAL ENCOUNTER (OUTPATIENT)
Facility: HOSPITAL | Age: 36
Setting detail: HOSPITAL OUTPATIENT SURGERY
Discharge: HOME | End: 2021-01-08
Attending: OTOLARYNGOLOGY | Admitting: OTOLARYNGOLOGY

## 2021-01-08 ENCOUNTER — ANESTHESIA (OUTPATIENT)
Dept: PERIOP | Facility: HOSPITAL | Age: 36
End: 2021-01-08

## 2021-01-08 ENCOUNTER — ANESTHESIA EVENT (OUTPATIENT)
Dept: PERIOP | Facility: HOSPITAL | Age: 36
End: 2021-01-08

## 2021-01-08 VITALS
TEMPERATURE: 98 F | SYSTOLIC BLOOD PRESSURE: 96 MMHG | HEART RATE: 74 BPM | RESPIRATION RATE: 16 BRPM | OXYGEN SATURATION: 97 % | DIASTOLIC BLOOD PRESSURE: 63 MMHG

## 2021-01-08 DIAGNOSIS — J34.89 CONCHA BULLOSA: ICD-10-CM

## 2021-01-08 DIAGNOSIS — J34.2 DEVIATED NASAL SEPTUM: ICD-10-CM

## 2021-01-08 DIAGNOSIS — J32.0 CHRONIC MAXILLARY SINUSITIS: ICD-10-CM

## 2021-01-08 DIAGNOSIS — J34.3 HYPERTROPHY OF BOTH INFERIOR NASAL TURBINATES: ICD-10-CM

## 2021-01-08 LAB — B-HCG UR QL: NEGATIVE

## 2021-01-08 PROCEDURE — 25010000002 ONDANSETRON PER 1 MG: Performed by: ANESTHESIOLOGY

## 2021-01-08 PROCEDURE — 25010000002 DEXAMETHASONE PER 1 MG: Performed by: NURSE ANESTHETIST, CERTIFIED REGISTERED

## 2021-01-08 PROCEDURE — 88305 TISSUE EXAM BY PATHOLOGIST: CPT | Performed by: OTOLARYNGOLOGY

## 2021-01-08 PROCEDURE — 25010000002 MIDAZOLAM PER 1 MG: Performed by: ANESTHESIOLOGY

## 2021-01-08 PROCEDURE — 88311 DECALCIFY TISSUE: CPT | Performed by: OTOLARYNGOLOGY

## 2021-01-08 PROCEDURE — 30520 REPAIR OF NASAL SEPTUM: CPT | Performed by: OTOLARYNGOLOGY

## 2021-01-08 PROCEDURE — 25010000002 PROPOFOL 10 MG/ML EMULSION: Performed by: NURSE ANESTHETIST, CERTIFIED REGISTERED

## 2021-01-08 PROCEDURE — 31240 NSL/SNS NDSC CNCH BULL RESCJ: CPT | Performed by: OTOLARYNGOLOGY

## 2021-01-08 PROCEDURE — 25010000002 DEXAMETHASONE PER 1 MG: Performed by: ANESTHESIOLOGY

## 2021-01-08 PROCEDURE — 30802 ABLATE INF TURBINATE SUBMUC: CPT | Performed by: OTOLARYNGOLOGY

## 2021-01-08 PROCEDURE — 81025 URINE PREGNANCY TEST: CPT | Performed by: OTOLARYNGOLOGY

## 2021-01-08 PROCEDURE — C2625 STENT, NON-COR, TEM W/DEL SY: HCPCS | Performed by: OTOLARYNGOLOGY

## 2021-01-08 PROCEDURE — 25010000002 ONDANSETRON PER 1 MG: Performed by: NURSE ANESTHETIST, CERTIFIED REGISTERED

## 2021-01-08 PROCEDURE — C9046 COCAINE HCL NASAL SOLUTION: HCPCS | Performed by: OTOLARYNGOLOGY

## 2021-01-08 PROCEDURE — 25010000002 COCAINE HCL 40 MG/ML SOLUTION: Performed by: OTOLARYNGOLOGY

## 2021-01-08 DEVICE — PROPEL SINUS IMPLANT
Type: IMPLANTABLE DEVICE | Site: SINUS | Status: FUNCTIONAL
Brand: PROPEL

## 2021-01-08 DEVICE — STPLR SEPTL ENTACT .5X3MM: Type: IMPLANTABLE DEVICE | Site: NOSE | Status: FUNCTIONAL

## 2021-01-08 RX ORDER — MIDAZOLAM HYDROCHLORIDE 1 MG/ML
1 INJECTION INTRAMUSCULAR; INTRAVENOUS
Status: COMPLETED | OUTPATIENT
Start: 2021-01-08 | End: 2021-01-08

## 2021-01-08 RX ORDER — DEXAMETHASONE SODIUM PHOSPHATE 4 MG/ML
4 INJECTION, SOLUTION INTRA-ARTICULAR; INTRALESIONAL; INTRAMUSCULAR; INTRAVENOUS; SOFT TISSUE ONCE AS NEEDED
Status: COMPLETED | OUTPATIENT
Start: 2021-01-08 | End: 2021-01-08

## 2021-01-08 RX ORDER — OXYMETAZOLINE HYDROCHLORIDE 0.05 G/100ML
2 SPRAY NASAL
Status: DISCONTINUED | OUTPATIENT
Start: 2021-01-08 | End: 2021-01-08 | Stop reason: HOSPADM

## 2021-01-08 RX ORDER — HYDROCODONE BITARTRATE AND ACETAMINOPHEN 5; 325 MG/1; MG/1
1 TABLET ORAL ONCE AS NEEDED
Status: DISCONTINUED | OUTPATIENT
Start: 2021-01-08 | End: 2021-01-08 | Stop reason: HOSPADM

## 2021-01-08 RX ORDER — HYDROCODONE BITARTRATE AND ACETAMINOPHEN 5; 325 MG/1; MG/1
1-2 TABLET ORAL EVERY 4 HOURS PRN
Qty: 8 TABLET | Refills: 0 | Status: SHIPPED | OUTPATIENT
Start: 2021-01-08 | End: 2021-01-16

## 2021-01-08 RX ORDER — SODIUM CHLORIDE 0.9 % (FLUSH) 0.9 %
3 SYRINGE (ML) INJECTION AS NEEDED
Status: DISCONTINUED | OUTPATIENT
Start: 2021-01-08 | End: 2021-01-08 | Stop reason: HOSPADM

## 2021-01-08 RX ORDER — ONDANSETRON 2 MG/ML
4 INJECTION INTRAMUSCULAR; INTRAVENOUS ONCE AS NEEDED
Status: COMPLETED | OUTPATIENT
Start: 2021-01-08 | End: 2021-01-08

## 2021-01-08 RX ORDER — SODIUM CHLORIDE, SODIUM LACTATE, POTASSIUM CHLORIDE, CALCIUM CHLORIDE 600; 310; 30; 20 MG/100ML; MG/100ML; MG/100ML; MG/100ML
1000 INJECTION, SOLUTION INTRAVENOUS CONTINUOUS
Status: DISCONTINUED | OUTPATIENT
Start: 2021-01-08 | End: 2021-01-08 | Stop reason: HOSPADM

## 2021-01-08 RX ORDER — LIDOCAINE HYDROCHLORIDE 10 MG/ML
0.5 INJECTION, SOLUTION EPIDURAL; INFILTRATION; INTRACAUDAL; PERINEURAL ONCE AS NEEDED
Status: DISCONTINUED | OUTPATIENT
Start: 2021-01-08 | End: 2021-01-08 | Stop reason: HOSPADM

## 2021-01-08 RX ORDER — WATER 1000 ML/1000ML
INJECTION, SOLUTION INTRAVENOUS AS NEEDED
Status: DISCONTINUED | OUTPATIENT
Start: 2021-01-08 | End: 2021-01-08 | Stop reason: HOSPADM

## 2021-01-08 RX ORDER — COCAINE HYDROCHLORIDE 40 MG/ML
SOLUTION NASAL
Status: DISCONTINUED
Start: 2021-01-08 | End: 2021-01-08 | Stop reason: HOSPADM

## 2021-01-08 RX ORDER — CLINDAMYCIN HYDROCHLORIDE 300 MG/1
300 CAPSULE ORAL 3 TIMES DAILY
Qty: 30 CAPSULE | Refills: 0 | Status: SHIPPED | OUTPATIENT
Start: 2021-01-08 | End: 2021-01-18

## 2021-01-08 RX ORDER — MAGNESIUM HYDROXIDE 1200 MG/15ML
LIQUID ORAL AS NEEDED
Status: DISCONTINUED | OUTPATIENT
Start: 2021-01-08 | End: 2021-01-08 | Stop reason: HOSPADM

## 2021-01-08 RX ORDER — SCOLOPAMINE TRANSDERMAL SYSTEM 1 MG/1
1 PATCH, EXTENDED RELEASE TRANSDERMAL CONTINUOUS
Status: DISCONTINUED | OUTPATIENT
Start: 2021-01-08 | End: 2021-01-08 | Stop reason: HOSPADM

## 2021-01-08 RX ORDER — ROCURONIUM BROMIDE 10 MG/ML
INJECTION, SOLUTION INTRAVENOUS AS NEEDED
Status: DISCONTINUED | OUTPATIENT
Start: 2021-01-08 | End: 2021-01-08 | Stop reason: SURG

## 2021-01-08 RX ORDER — LABETALOL HYDROCHLORIDE 5 MG/ML
5 INJECTION, SOLUTION INTRAVENOUS
Status: DISCONTINUED | OUTPATIENT
Start: 2021-01-08 | End: 2021-01-08 | Stop reason: HOSPADM

## 2021-01-08 RX ORDER — FENTANYL CITRATE 50 UG/ML
25 INJECTION, SOLUTION INTRAMUSCULAR; INTRAVENOUS
Status: DISCONTINUED | OUTPATIENT
Start: 2021-01-08 | End: 2021-01-08 | Stop reason: HOSPADM

## 2021-01-08 RX ORDER — SODIUM CHLORIDE 0.9 % (FLUSH) 0.9 %
10 SYRINGE (ML) INJECTION EVERY 12 HOURS SCHEDULED
Status: DISCONTINUED | OUTPATIENT
Start: 2021-01-08 | End: 2021-01-08 | Stop reason: HOSPADM

## 2021-01-08 RX ORDER — OXYCODONE AND ACETAMINOPHEN 10; 325 MG/1; MG/1
1 TABLET ORAL ONCE AS NEEDED
Status: DISCONTINUED | OUTPATIENT
Start: 2021-01-08 | End: 2021-01-08 | Stop reason: HOSPADM

## 2021-01-08 RX ORDER — OXYCODONE AND ACETAMINOPHEN 7.5; 325 MG/1; MG/1
2 TABLET ORAL EVERY 4 HOURS PRN
Status: DISCONTINUED | OUTPATIENT
Start: 2021-01-08 | End: 2021-01-08 | Stop reason: HOSPADM

## 2021-01-08 RX ORDER — ONDANSETRON 2 MG/ML
INJECTION INTRAMUSCULAR; INTRAVENOUS AS NEEDED
Status: DISCONTINUED | OUTPATIENT
Start: 2021-01-08 | End: 2021-01-08 | Stop reason: SURG

## 2021-01-08 RX ORDER — FLUMAZENIL 0.1 MG/ML
0.2 INJECTION INTRAVENOUS AS NEEDED
Status: DISCONTINUED | OUTPATIENT
Start: 2021-01-08 | End: 2021-01-08 | Stop reason: HOSPADM

## 2021-01-08 RX ORDER — IBUPROFEN 600 MG/1
600 TABLET ORAL ONCE AS NEEDED
Status: DISCONTINUED | OUTPATIENT
Start: 2021-01-08 | End: 2021-01-08 | Stop reason: HOSPADM

## 2021-01-08 RX ORDER — DEXTROSE MONOHYDRATE 25 G/50ML
12.5 INJECTION, SOLUTION INTRAVENOUS AS NEEDED
Status: DISCONTINUED | OUTPATIENT
Start: 2021-01-08 | End: 2021-01-08 | Stop reason: HOSPADM

## 2021-01-08 RX ORDER — PROPOFOL 10 MG/ML
VIAL (ML) INTRAVENOUS AS NEEDED
Status: DISCONTINUED | OUTPATIENT
Start: 2021-01-08 | End: 2021-01-08 | Stop reason: SURG

## 2021-01-08 RX ORDER — SODIUM CHLORIDE, SODIUM LACTATE, POTASSIUM CHLORIDE, CALCIUM CHLORIDE 600; 310; 30; 20 MG/100ML; MG/100ML; MG/100ML; MG/100ML
9 INJECTION, SOLUTION INTRAVENOUS CONTINUOUS
Status: DISCONTINUED | OUTPATIENT
Start: 2021-01-08 | End: 2021-01-08 | Stop reason: HOSPADM

## 2021-01-08 RX ORDER — SODIUM CHLORIDE 0.9 % (FLUSH) 0.9 %
10 SYRINGE (ML) INJECTION AS NEEDED
Status: DISCONTINUED | OUTPATIENT
Start: 2021-01-08 | End: 2021-01-08 | Stop reason: HOSPADM

## 2021-01-08 RX ORDER — NEOSTIGMINE METHYLSULFATE 5 MG/5 ML
SYRINGE (ML) INTRAVENOUS AS NEEDED
Status: DISCONTINUED | OUTPATIENT
Start: 2021-01-08 | End: 2021-01-08 | Stop reason: SURG

## 2021-01-08 RX ORDER — NALOXONE HCL 0.4 MG/ML
0.4 VIAL (ML) INJECTION AS NEEDED
Status: DISCONTINUED | OUTPATIENT
Start: 2021-01-08 | End: 2021-01-08 | Stop reason: HOSPADM

## 2021-01-08 RX ORDER — COCAINE HYDROCHLORIDE 40 MG/ML
SOLUTION NASAL AS NEEDED
Status: DISCONTINUED | OUTPATIENT
Start: 2021-01-08 | End: 2021-01-08 | Stop reason: HOSPADM

## 2021-01-08 RX ORDER — LIDOCAINE HYDROCHLORIDE AND EPINEPHRINE 10; 10 MG/ML; UG/ML
INJECTION, SOLUTION INFILTRATION; PERINEURAL AS NEEDED
Status: DISCONTINUED | OUTPATIENT
Start: 2021-01-08 | End: 2021-01-08 | Stop reason: HOSPADM

## 2021-01-08 RX ORDER — DEXAMETHASONE SODIUM PHOSPHATE 4 MG/ML
INJECTION, SOLUTION INTRA-ARTICULAR; INTRALESIONAL; INTRAMUSCULAR; INTRAVENOUS; SOFT TISSUE AS NEEDED
Status: DISCONTINUED | OUTPATIENT
Start: 2021-01-08 | End: 2021-01-08 | Stop reason: SURG

## 2021-01-08 RX ADMIN — LIDOCAINE HYDROCHLORIDE 100 MG: 20 INJECTION, SOLUTION INTRAVENOUS at 09:38

## 2021-01-08 RX ADMIN — Medication 3 MG: at 10:34

## 2021-01-08 RX ADMIN — ONDANSETRON HYDROCHLORIDE 4 MG: 2 SOLUTION INTRAMUSCULAR; INTRAVENOUS at 09:49

## 2021-01-08 RX ADMIN — HYDROCODONE BITARTRATE AND ACETAMINOPHEN 1 TABLET: 5; 325 TABLET ORAL at 11:55

## 2021-01-08 RX ADMIN — DEXAMETHASONE SODIUM PHOSPHATE 8 MG: 4 INJECTION, SOLUTION INTRAMUSCULAR; INTRAVENOUS at 09:49

## 2021-01-08 RX ADMIN — SODIUM CHLORIDE, POTASSIUM CHLORIDE, SODIUM LACTATE AND CALCIUM CHLORIDE 1000 ML: 600; 310; 30; 20 INJECTION, SOLUTION INTRAVENOUS at 06:40

## 2021-01-08 RX ADMIN — PROPOFOL 200 MG: 10 INJECTION, EMULSION INTRAVENOUS at 09:38

## 2021-01-08 RX ADMIN — DEXAMETHASONE SODIUM PHOSPHATE 4 MG: 4 INJECTION, SOLUTION INTRAMUSCULAR; INTRAVENOUS at 08:21

## 2021-01-08 RX ADMIN — Medication 18 MCG: at 10:13

## 2021-01-08 RX ADMIN — MIDAZOLAM 1 MG: 1 INJECTION INTRAMUSCULAR; INTRAVENOUS at 08:42

## 2021-01-08 RX ADMIN — MIDAZOLAM 1 MG: 1 INJECTION INTRAMUSCULAR; INTRAVENOUS at 08:30

## 2021-01-08 RX ADMIN — ROCURONIUM BROMIDE 25 MG: 10 INJECTION INTRAVENOUS at 09:38

## 2021-01-08 RX ADMIN — SCOPALAMINE 1 PATCH: 1 PATCH, EXTENDED RELEASE TRANSDERMAL at 08:21

## 2021-01-08 RX ADMIN — ONDANSETRON HYDROCHLORIDE 4 MG: 2 SOLUTION INTRAMUSCULAR; INTRAVENOUS at 11:25

## 2021-01-08 RX ADMIN — GLYCOPYRROLATE 0.4 MG: 0.2 INJECTION, SOLUTION INTRAMUSCULAR; INTRAVENOUS at 10:34

## 2021-01-08 RX ADMIN — PROPOFOL 100 MG: 10 INJECTION, EMULSION INTRAVENOUS at 10:16

## 2021-01-08 RX ADMIN — Medication 3 MG: at 10:47

## 2021-01-08 RX ADMIN — GLYCOPYRROLATE 0.4 MG: 0.2 INJECTION, SOLUTION INTRAMUSCULAR; INTRAVENOUS at 10:47

## 2021-01-08 RX ADMIN — Medication 18 MCG: at 09:40

## 2021-01-08 RX ADMIN — Medication 2 SPRAY: at 08:21

## 2021-01-08 NOTE — ANESTHESIA PREPROCEDURE EVALUATION
Anesthesia Evaluation     Patient summary reviewed   no history of anesthetic complications:  NPO Solid Status: > 8 hours             Airway   Mallampati: I  TM distance: >3 FB  Neck ROM: full  Dental      Pulmonary    (-) COPD, asthma, sleep apnea, not a smoker  Cardiovascular   Exercise tolerance: excellent (>7 METS)    (-) pacemaker, past MI, angina, cardiac stents      Neuro/Psych  (+) headaches,     (-) seizures, TIA, CVA  GI/Hepatic/Renal/Endo    (+)   thyroid problem hypothyroidism  (-) GERD, liver disease, no renal disease, diabetes    Musculoskeletal     Abdominal    Substance History      OB/GYN    (-)  Pregnant        Other                        Anesthesia Plan    ASA 2     general     intravenous induction     Anesthetic plan, all risks, benefits, and alternatives have been provided, discussed and informed consent has been obtained with: patient.

## 2021-01-08 NOTE — ANESTHESIA PROCEDURE NOTES
Airway  Urgency: elective    Date/Time: 1/8/2021 9:40 AM  Airway not difficult    General Information and Staff    Patient location during procedure: OR  CRNA: GONZALEZ Bran CRNA    Indications and Patient Condition  Indications for airway management: airway protection    Preoxygenated: yes  MILS maintained throughout  Mask difficulty assessment: 1 - vent by mask    Final Airway Details  Final airway type: endotracheal airway      Successful airway: ETT  Cuffed: yes   Successful intubation technique: direct laryngoscopy  Facilitating devices/methods: intubating stylet  Endotracheal tube insertion site: oral  Blade: Arita  Blade size: 2  ETT size (mm): 7.0  Cormack-Lehane Classification: grade I - full view of glottis  Placement verified by: chest auscultation and capnometry   Cuff volume (mL): 5  Measured from: lips  ETT/EBT  to lips (cm): 21  Number of attempts at approach: 1  Assessment: lips, teeth, and gum same as pre-op and atraumatic intubation

## 2021-01-08 NOTE — ANESTHESIA POSTPROCEDURE EVALUATION
Patient: Katie France    Procedure Summary     Date: 01/08/21 Room / Location:  [facility]  /  [facility]    Anesthesia Start: 0936 Anesthesia Stop: 1058    Procedures:       Septoplasty, bilateral inferior turbinate reduction via Coblation, resection of bilateral yony bullosa deformities via rigid nasal endoscopy (N/A Nose)      rigid nasal endoscopy (N/A Nose) Diagnosis:       Deviated nasal septum      Hypertrophy of both inferior nasal turbinates      Chronic maxillary sinusitis      Yony bullosa      (Deviated nasal septum [J34.2])      (Hypertrophy of both inferior nasal turbinates [J34.3])      (Chronic maxillary sinusitis [J32.0])      (Yony bullosa [J34.89])    Surgeon: Ahmet Kasper MD Provider: GONZALEZ Bran CRNA    Anesthesia Type: general ASA Status: 2          Anesthesia Type: general    Vitals  Vitals Value Taken Time   BP 81/52 01/08/21 1129   Temp 98.1 °F (36.7 °C) 01/08/21 1100   Pulse 71 01/08/21 1131   Resp 20 01/08/21 1115   SpO2 97 % 01/08/21 1131   Vitals shown include unvalidated device data.        Post Anesthesia Care and Evaluation    Patient location during evaluation: PACU  Patient participation: complete - patient participated  Level of consciousness: awake and alert  Pain score: 0  Pain management: adequate  Airway patency: patent  Anesthetic complications: No anesthetic complications    Cardiovascular status: acceptable and stable  Respiratory status: acceptable and unassisted  Hydration status: acceptable

## 2021-01-11 LAB
LAB AP CASE REPORT: NORMAL
PATH REPORT.FINAL DX SPEC: NORMAL
PATH REPORT.GROSS SPEC: NORMAL

## 2021-01-18 ENCOUNTER — TRANSCRIBE ORDERS (OUTPATIENT)
Dept: LAB | Facility: HOSPITAL | Age: 36
End: 2021-01-18

## 2021-01-18 DIAGNOSIS — Z01.818 PRE-OP TESTING: Primary | ICD-10-CM

## 2021-01-19 ENCOUNTER — LAB (OUTPATIENT)
Dept: LAB | Facility: HOSPITAL | Age: 36
End: 2021-01-19

## 2021-01-19 DIAGNOSIS — Z01.818 PRE-OP TESTING: ICD-10-CM

## 2021-01-19 LAB — SARS-COV-2 ORF1AB RESP QL NAA+PROBE: NOT DETECTED

## 2021-01-19 PROCEDURE — C9803 HOPD COVID-19 SPEC COLLECT: HCPCS

## 2021-01-19 PROCEDURE — U0004 COV-19 TEST NON-CDC HGH THRU: HCPCS

## 2021-01-22 ENCOUNTER — OFFICE VISIT (OUTPATIENT)
Dept: OTOLARYNGOLOGY | Facility: CLINIC | Age: 36
End: 2021-01-22

## 2021-01-22 VITALS
BODY MASS INDEX: 25.69 KG/M2 | HEIGHT: 65 IN | TEMPERATURE: 98.2 F | WEIGHT: 154.2 LBS | SYSTOLIC BLOOD PRESSURE: 122 MMHG | HEART RATE: 75 BPM | DIASTOLIC BLOOD PRESSURE: 63 MMHG

## 2021-01-22 DIAGNOSIS — Z98.890 S/P SINUS SURGERY: Primary | ICD-10-CM

## 2021-01-22 PROBLEM — J34.2 DEVIATED NASAL SEPTUM: Status: RESOLVED | Noted: 2020-09-03 | Resolved: 2021-01-22

## 2021-01-22 PROBLEM — J32.0 CHRONIC MAXILLARY SINUSITIS: Status: RESOLVED | Noted: 2020-12-15 | Resolved: 2021-01-22

## 2021-01-22 PROBLEM — J34.3 HYPERTROPHY OF BOTH INFERIOR NASAL TURBINATES: Status: RESOLVED | Noted: 2020-09-03 | Resolved: 2021-01-22

## 2021-01-22 PROBLEM — J32.9 CHRONIC SINUSITIS: Status: RESOLVED | Noted: 2020-09-03 | Resolved: 2021-01-22

## 2021-01-22 PROBLEM — R09.81 CHRONIC NASAL CONGESTION: Status: RESOLVED | Noted: 2020-09-03 | Resolved: 2021-01-22

## 2021-01-22 PROBLEM — J34.89 CONCHA BULLOSA: Status: RESOLVED | Noted: 2020-12-15 | Resolved: 2021-01-22

## 2021-01-22 PROCEDURE — 31237 NSL/SINS NDSC SURG BX POLYPC: CPT | Performed by: PHYSICIAN ASSISTANT

## 2021-01-22 PROCEDURE — 99024 POSTOP FOLLOW-UP VISIT: CPT | Performed by: PHYSICIAN ASSISTANT

## 2021-01-22 NOTE — PROGRESS NOTES
Procedure Note    Pre-operative Diagnosis:  s/p recent sinonasal surgery    Post-operative Diagnosis: same    Anesthesia: topical 4% tetracaine and oxymetazoline mix    Endoscopy Type:  Nasal Endoscopy with debridement    Procedure Details:    After topical anesthesia and decongestion, the patient was placed in the sitting position.  An endoscope was passed along the left nasal floor to the nasopharynx.  It was then passed into the region of the middle meatus, middle turbinate, and the sphenoethmoid region. Debridement of postoperative crusting and removal of old blood and retained secretions was performed with forceps and suctioning. An identical procedure was performed on the right side.  The following findings were noted:    Findings:  Intranasal Exam: mucosa normal, vestibule within normal limits, inferior turbinate normal, anterior nasal septum non-obstructing, no polyps seen; right stent partially in place    Condition:  Stable.  Patient tolerated procedure well.    Complications:  None

## 2021-01-22 NOTE — PATIENT INSTRUCTIONS
Continue saline spray and bactroban ointment 2 times a day. Recheck in six weeks. If symptoms worsen call for sooner appointment.

## 2021-01-22 NOTE — PROGRESS NOTES
MUKESH Rodriguez     Chief Complaint   Patient presents with   • Post-op        HISTORY OF PRESENT ILLNESS:     Katie France is a  35 y.o.  female who presents for follow up s/p septoplasty, bilateral inferior turbinate reduction via Coblation, resection of bilateral yony bullosa deformities via rigid nasal endoscopy on 1/8/21 by Dr. Kasper. The patient has had a relatively normal postoperative course. The patient has had some congestion, dryness, and crusting.    Review of Systems   Constitutional: Negative for activity change, appetite change, chills, diaphoresis, fatigue, fever and unexpected weight change.   HENT: Positive for congestion. Negative for dental problem, drooling, ear discharge, ear pain, facial swelling, hearing loss, mouth sores, nosebleeds, postnasal drip, rhinorrhea, sinus pressure, sneezing, sore throat, tinnitus, trouble swallowing and voice change.         Nasal dryness   Eyes: Negative.    Respiratory: Negative.    Cardiovascular: Negative.    Gastrointestinal: Negative.    Endocrine: Negative.    Skin: Negative.    Allergic/Immunologic: Negative for environmental allergies, food allergies and immunocompromised state.   Neurological: Negative.    Hematological: Negative.    Psychiatric/Behavioral: Negative.    :    Past History:  Past Medical History:   Diagnosis Date   • Anxiety    • Bladder stones     ABLE TO PASS ON OWN   • Breathing difficult    • Headache    • Irritable bowel    • IUD (intrauterine device) in place    • Migraines    • Snoring      Past Surgical History:   Procedure Laterality Date   • CHOLECYSTECTOMY     • ENDOSCOPIC FUNCTIONAL SINUS SURGERY (FESS) N/A 1/8/2021    Procedure: Septoplasty, bilateral inferior turbinate reduction via Coblation, resection of bilateral yony bullosa deformities via rigid nasal endoscopy;  Surgeon: Ahmet Kasper MD;  Location: [facility];  Service: ENT;  Laterality: N/A;   • NASAL ENDOSCOPY N/A 1/8/2021    Procedure: rigid  nasal endoscopy;  Surgeon: Ahmet Kasper MD;  Location: [facility];  Service: ENT;  Laterality: N/A;     Family History   Problem Relation Age of Onset   • Heart attack Mother    • Hypertension Mother    • Heart attack Father      Social History     Tobacco Use   • Smoking status: Never Smoker   • Smokeless tobacco: Never Used   Substance Use Topics   • Alcohol use: No   • Drug use: No     Outpatient Medications Marked as Taking for the 1/22/21 encounter (Office Visit) with Blair Ashby PA   Medication Sig Dispense Refill   • escitalopram (LEXAPRO) 10 MG tablet Take 10 mg by mouth Daily.     • levothyroxine (SYNTHROID, LEVOTHROID) 25 MCG tablet Take 25 mcg by mouth Daily.     • mupirocin (BACTROBAN) 2 % ointment Apply into each nostril two times a day for 14 days 22 g 0   • SUMAtriptan (IMITREX) 50 MG tablet TAKE 1 TABLET AT LEAST 2 HOURS BETWEEN DOSES AS NEEDED TWICE A DAY       Allergies:  Amoxicillin          Vital Signs:   Vitals:    01/22/21 0909   BP: 122/63   Pulse: 75   Temp: 98.2 °F (36.8 °C)         EXAMINATION:   CONSTITUTIONAL: well nourished, alert, oriented, in no acute distress     COMMUNICATION AND VOICE: able to communicate normally, normal voice quality    HEAD: normocephalic, no lesions, atraumatic, no tenderness, no masses     FACE: appearance normal, no lesions, no tenderness, no deformities, facial motion symmetric    EYES: ocular motility normal, eyelids normal, orbits normal, no proptosis, conjunctiva normal , pupils equal, round     EARS:  Hearing: response to conversational voice normal bilaterally   External Ears: auricles without lesions    NOSE:  External Nose: structure normal, no tenderness on palpation, no nasal discharge, no lesions, no evidence of trauma, nostrils patent   Intranasal Exam: see procedure note     ORAL:  Lips: upper and lower lips without lesion     NECK: neck appearance normal    CHEST/RESPIRATORY: respiratory effort normal, normal breath sounds      CARDIOVASCULAR: rate and rhythm normal, extremities without cyanosis or edema      NEUROLOGIC/PSYCHIATRIC: oriented to time, place and person, mood normal, affect appropriate, CN II-XII intact grossly    RESULTS REVIEW:    I have reviewed the patients old records in the chart.       Assessment    Diagnosis Plan   1. S/P sinus surgery         Plan    Patient Instructions   Continue saline spray as needed and bactroban ointment 2 times a day. Recheck in six weeks.               Return in about 6 weeks (around 3/5/2021) for Recheck sinuses second post op.    MUKESH Rodriguez  01/22/21  09:17 CST

## 2021-01-25 NOTE — PROGRESS NOTES
Ahmet Kasper MD   I have seen and/or examined Katie France and have reviewed the notes, assessments, and/or procedures and I concur with this documentation.    Ahmet Kasper MD, FACS  01/25/21  9:53 AM CST

## 2021-03-04 ENCOUNTER — TRANSCRIBE ORDERS (OUTPATIENT)
Dept: LAB | Facility: HOSPITAL | Age: 36
End: 2021-03-04

## 2021-03-04 DIAGNOSIS — Z01.818 PRE-OP TESTING: Primary | ICD-10-CM

## 2021-03-05 ENCOUNTER — LAB (OUTPATIENT)
Dept: LAB | Facility: HOSPITAL | Age: 36
End: 2021-03-05

## 2021-03-05 PROCEDURE — U0004 COV-19 TEST NON-CDC HGH THRU: HCPCS | Performed by: OTOLARYNGOLOGY

## 2021-03-05 PROCEDURE — C9803 HOPD COVID-19 SPEC COLLECT: HCPCS | Performed by: OTOLARYNGOLOGY

## 2021-03-07 LAB — SARS-COV-2 ORF1AB RESP QL NAA+PROBE: NOT DETECTED

## 2021-03-08 NOTE — PROGRESS NOTES
YOB: 1985  Location: [facility]  Location Address: [address] [place] [address], [facility] [address], [address] [place] [address]  Location Phone: [phone]    Chief Complaint   Patient presents with   • Follow-up     pressure in head, nasal drainage       History of Present Illness  Katie France is a 35 y.o. female.  Katie France is status post Septoplasty, Bilateral inferior turbinate reduction via Coblation and Bilateral yony bullosa resection. Patient has occasional nasal congestion, dry eyes, postnasal drip, green nasal drainage and orbital and frontal sinus pressure. Patient states she has lost part of her propel stent. She denies epistaxis and fever.     I have personally reviewed the information imported into the chart during this visit.      I have personally reviewed the review of systems.            Past Medical History:   Diagnosis Date   • Anxiety    • Bladder stones     ABLE TO PASS ON OWN   • Breathing difficult    • Headache    • Irritable bowel    • IUD (intrauterine device) in place    • Migraines    • Snoring        Past Surgical History:   Procedure Laterality Date   • CHOLECYSTECTOMY     • ENDOSCOPIC FUNCTIONAL SINUS SURGERY (FESS) N/A 2021    Procedure: Septoplasty, bilateral inferior turbinate reduction via Coblation, resection of bilateral yony bullosa deformities via rigid nasal endoscopy;  Surgeon: Ahmet Kasper MD;  Location: [facility] OR;  Service: ENT;  Laterality: N/A;   • NASAL ENDOSCOPY N/A 2021    Procedure: rigid nasal endoscopy;  Surgeon: Ahmet Kasper MD;  Location: [facility] OR;  Service: ENT;  Laterality: N/A;       Outpatient Medications Marked as Taking for the 3/9/21 encounter (Office Visit) with Ahmet Kasper MD   Medication Sig Dispense Refill   • escitalopram (LEXAPRO) 10 MG tablet Take 10 mg by mouth Daily.     • levothyroxine (SYNTHROID, LEVOTHROID) 25 MCG tablet Take 25 mcg by mouth Daily.     • SUMAtriptan (IMITREX) 50 MG tablet TAKE 1 TABLET AT  LEAST 2 HOURS BETWEEN DOSES AS NEEDED TWICE A DAY         Amoxicillin    Family History   Problem Relation Age of Onset   • Heart attack Mother    • Hypertension Mother    • Heart attack Father        Social History     Socioeconomic History   • Marital status:      Spouse name: Not on file   • Number of children: Not on file   • Years of education: Not on file   • Highest education level: Not on file   Tobacco Use   • Smoking status: Never Smoker   • Smokeless tobacco: Never Used   Vaping Use   • Vaping Use: Never used   Substance and Sexual Activity   • Alcohol use: No   • Drug use: No   • Sexual activity: Defer       Review of Systems   HENT: Positive for congestion, postnasal drip, rhinorrhea and sinus pressure.    Eyes:        Dry eyes   Respiratory: Negative.    Cardiovascular: Negative.    Gastrointestinal: Negative.    Endocrine: Negative.    Genitourinary: Negative.    Musculoskeletal: Negative.    Skin: Negative.    Allergic/Immunologic: Positive for environmental allergies.   Neurological: Negative.    Hematological: Negative.    Psychiatric/Behavioral: Negative.        Vitals:    03/09/21 1320   BP: 105/70   Pulse: 84   Temp: 98.4 °F (36.9 °C)       Body mass index is 24.73 kg/m².    Objective     Physical Exam  CONSTITUTIONAL: well nourished, well-developed, alert, oriented, in no acute distress     COMMUNICATION AND VOICE: able to communicate normally, normal voice quality    HEAD: normocephalic, no lesions, atraumatic, no tenderness, no masses     FACE: appearance normal, no lesions, no tenderness, no deformities, facial motion symmetric    EYES: ocular motility normal, eyelids normal, orbits normal, no proptosis, conjunctiva normal , pupils equal, round     EARS:  Hearing: hearing to conversational voice intact bilaterally   External Ears: normal bilaterally, no lesions    NOSE:  External Nose: external nasal structure normal, no tenderness on palpation, no nasal discharge, no lesions, no  evidence of trauma, nostrils patent   Intranasal exam: See endoscopy    ORAL:  Lips: upper and lower lips without lesion     NECK:  Inspection and Palpation: neck appearance normal, no masses or tenderness    CHEST/RESPIRATORY: normal respiratory effort     CARDIOVASCULAR: no cyanosis or edema     NEUROLOGICAL/PSYCHIATRIC: oriented to time, place and person, mood normal, affect appropriate, CN II-XII intact grossly    Assessment/Plan   Diagnoses and all orders for this visit:    1. Status post functional endoscopic sinus surgery (FESS) (Primary)    Other orders  -     azelastine (ASTELIN) 0.1 % nasal spray; 2 sprays into the nostril(s) as directed by provider 2 (Two) Times a Day for 30 days. Use in each nostril as directed  Dispense: 30 mL; Refill: 6  -     fluticasone (Flonase) 50 MCG/ACT nasal spray; 2 sprays into the nostril(s) as directed by provider Daily for 30 days. Administer 2 sprays in each nostril for each dose.  Dispense: 18.2 mL; Refill: 7      * Surgery not found *  No orders of the defined types were placed in this encounter.    Return in about 3 months (around 6/9/2021).       Patient Instructions   Call return for problems  Continue Ocean Spray as needed  Resume intranasal steroid spray and intranasal antihistamine spray  Follow-up in 3 to 4 months

## 2021-03-09 ENCOUNTER — OFFICE VISIT (OUTPATIENT)
Dept: OTOLARYNGOLOGY | Facility: CLINIC | Age: 36
End: 2021-03-09

## 2021-03-09 VITALS
HEIGHT: 65 IN | BODY MASS INDEX: 24.76 KG/M2 | WEIGHT: 148.6 LBS | DIASTOLIC BLOOD PRESSURE: 70 MMHG | TEMPERATURE: 98.4 F | SYSTOLIC BLOOD PRESSURE: 105 MMHG | HEART RATE: 84 BPM

## 2021-03-09 DIAGNOSIS — Z98.890 STATUS POST FUNCTIONAL ENDOSCOPIC SINUS SURGERY (FESS): Primary | ICD-10-CM

## 2021-03-09 PROCEDURE — 99024 POSTOP FOLLOW-UP VISIT: CPT | Performed by: OTOLARYNGOLOGY

## 2021-03-09 PROCEDURE — 31237 NSL/SINS NDSC SURG BX POLYPC: CPT | Performed by: OTOLARYNGOLOGY

## 2021-03-09 RX ORDER — AZELASTINE 1 MG/ML
2 SPRAY, METERED NASAL 2 TIMES DAILY
Qty: 30 ML | Refills: 6 | Status: SHIPPED | OUTPATIENT
Start: 2021-03-09 | End: 2021-04-08

## 2021-03-09 RX ORDER — FLUTICASONE PROPIONATE 50 MCG
2 SPRAY, SUSPENSION (ML) NASAL DAILY
Qty: 18.2 ML | Refills: 7 | Status: SHIPPED | OUTPATIENT
Start: 2021-03-09 | End: 2022-11-18

## 2021-03-09 NOTE — PATIENT INSTRUCTIONS
Call return for problems  Continue Ocean Spray as needed  Resume intranasal steroid spray and intranasal antihistamine spray  Follow-up in 3 to 4 months

## 2021-03-09 NOTE — PROGRESS NOTES
Procedure Note    Katie France    DATE OF PROCEDURE: 3/9/2021    PROCEDURE:   Bilateral Rigid Nasal Endoscopy with debridement     PREPROCEDURE DIAGNOSIS:   Chronic rhinosinusitis S/P FESS and ITR  Bilateral yony bullosa resection     POSTPROCEDURE DIAGNOSIS:  SAME     ANESTHESIA:   None       Procedure Details:    After topical anesthesia and decongestion, the patient was placed in the sitting position.  An endoscope was passed along the left nasal floor to the nasopharynx.  It was then passed into the region of the middle meatus, middle turbinate, and the sphenoethmoid region. An identical procedure was performed on the right side.  The following findings were noted as stated below:    Findings: Patent antrostomies bilaterally with minimal mucus suctioned from the left antrum but no evidence of crusting.  On the right moderate amount of crusting is noted over the inferior aspect of the antrum extending posteriorly to the ground lamella.  This was removed in 1 piece with combination of bayonet forceps and suction.    Condition:  Stable.  Patient tolerated procedure well.    Complications:  None

## 2021-06-02 ENCOUNTER — TRANSCRIBE ORDERS (OUTPATIENT)
Dept: LAB | Facility: HOSPITAL | Age: 36
End: 2021-06-02

## 2021-06-02 DIAGNOSIS — Z01.818 PREOP TESTING: Primary | ICD-10-CM

## 2021-10-04 NOTE — PROGRESS NOTES
YOB: 1985  Location: [facility]  Location Address: [address] [place] [address], [facility] [address], [address] [place] [address]  Location Phone: [phone]    Chief Complaint   Patient presents with   • Follow-up     PT having some issues trouble; congested and lots of pressure started about 1 month ago       History of Present Illness  Katie France is a 35 y.o. female.  Katie France is here for follow up of ENT complaints. The patient is status Septoplasty, Bilateral inferior turbinate reduction via Coblation and Bilateral yony bullosa resection on 21. For the past month, she has had nasal congestion, dryness, postnasal drainage, maxillary sinus pressure, and headache. Patient denies nasal drainage. She has been using nasal sprays.   She has noticed that when she lies on one side in bed at night onset of her nose gets stopped up and then if she changes is able to open up.  Her symptoms are relieved by previous nasal strip.       Past Medical History:   Diagnosis Date   • Anxiety    • Bladder stones     ABLE TO PASS ON OWN   • Breathing difficult    • COVID-19 vaccine series completed     MODERNA   • Headache    • Irritable bowel    • IUD (intrauterine device) in place    • Migraines    • Snoring        Past Surgical History:   Procedure Laterality Date   • CHOLECYSTECTOMY     • ENDOSCOPIC FUNCTIONAL SINUS SURGERY (FESS) N/A 2021    Procedure: Septoplasty, bilateral inferior turbinate reduction via Coblation, resection of bilateral yony bullosa deformities via rigid nasal endoscopy;  Surgeon: Ahmet Kasper MD;  Location: [facility];  Service: ENT;  Laterality: N/A;   • NASAL ENDOSCOPY N/A 2021    Procedure: rigid nasal endoscopy;  Surgeon: Ahmet Kasper MD;  Location: [facility];  Service: ENT;  Laterality: N/A;       Outpatient Medications Marked as Taking for the 10/5/21 encounter (Office Visit) with Ahmet Kasper MD   Medication Sig Dispense Refill   • escitalopram (LEXAPRO) 10 MG  tablet Take 10 mg by mouth Daily.     • SUMAtriptan (IMITREX) 50 MG tablet TAKE 1 TABLET AT LEAST 2 HOURS BETWEEN DOSES AS NEEDED TWICE A DAY     • [DISCONTINUED] levothyroxine (SYNTHROID, LEVOTHROID) 25 MCG tablet Take 25 mcg by mouth Daily.         Amoxicillin    Family History   Problem Relation Age of Onset   • Heart attack Mother    • Hypertension Mother    • Heart attack Father        Social History     Socioeconomic History   • Marital status:    Tobacco Use   • Smoking status: Never Smoker   • Smokeless tobacco: Never Used   Vaping Use   • Vaping Use: Never used   Substance and Sexual Activity   • Alcohol use: No   • Drug use: No   • Sexual activity: Defer       Review of Systems   Constitutional: Negative.    HENT: Positive for congestion, postnasal drip and sinus pressure.    Eyes: Negative.    Respiratory: Negative.    Cardiovascular: Negative.    Gastrointestinal: Negative.    Endocrine: Negative.    Genitourinary: Negative.    Musculoskeletal: Negative.    Skin: Negative.    Allergic/Immunologic: Negative.    Neurological: Positive for headaches.   Hematological: Negative.    Psychiatric/Behavioral: Negative.        Vitals:    10/05/21 1454   BP: 100/68   Pulse: 68   Temp: 98 °F (36.7 °C)       Body mass index is 24.83 kg/m².    Objective     Physical Exam  CONSTITUTIONAL: well nourished, well-developed, alert, oriented, in no acute distress     COMMUNICATION AND VOICE: able to communicate normally, normal voice quality    HEAD: normocephalic, no lesions, atraumatic, no tenderness, no masses     FACE: appearance normal, no lesions, no tenderness, no deformities, facial motion symmetric    EYES: ocular motility normal, eyelids normal, orbits normal, no proptosis, conjunctiva normal , pupils equal, round     EARS:  Hearing: hearing to conversational voice intact bilaterally   External Ears: normal bilaterally, no lesions      NOSE:  External Nose: external nasal structure normal, no tenderness on  palpation, no nasal discharge, no lesions, no evidence of trauma, nostrils patent -occlusion of the right nostril results and collapse of the nasal ala on inspiration on the left.  Similar problem occurs on the right when the left is obstructed digitally.  Intranasal exam: See endoscopy    ORAL:  Lips: upper and lower lips without lesion   OC/OP-clear    NECK:  Inspection and Palpation: neck appearance normal, no masses or tenderness    CHEST/RESPIRATORY: normal respiratory effort     CARDIOVASCULAR: no cyanosis or edema     NEUROLOGICAL/PSYCHIATRIC: oriented to time, place and person, mood normal, affect appropriate, CN II-XII intact grossly    Assessment/Plan   Diagnoses and all orders for this visit:    1. Status post functional endoscopic sinus surgery (FESS) (Primary)  -     Case Request; Standing  -     COVID PRE-OP / PRE-PROCEDURE SCREENING ORDER (NO ISOLATION) - Swab, Nasopharynx; Future  -     Comprehensive Metabolic Panel; Future  -     CBC (No Diff); Future  -     Case Request    2. Nasal valve collapse  -     Case Request; Standing  -     COVID PRE-OP / PRE-PROCEDURE SCREENING ORDER (NO ISOLATION) - Swab, Nasopharynx; Future  -     Comprehensive Metabolic Panel; Future  -     CBC (No Diff); Future  -     Case Request    Other orders  -     Follow Anesthesia Guidelines / Standing Orders; Future  -     Obtain Informed Consent      Bilateral rigid nasal endoscopy with bilateral Latera implants (20 mm) with resection of caudal left inferior septal spur (N/A)  Orders Placed This Encounter   Procedures   • COVID PRE-OP / PRE-PROCEDURE SCREENING ORDER (NO ISOLATION) - Swab, Nasopharynx     Standing Status:   Future     Standing Expiration Date:   10/25/2022     Order Specific Question:   Please select your location:     Answer:    [facility]     Order Specific Question:   COVID Screening Order:     Answer:   In-House: MARIE KIRKPATRICK, 24 HR TAT [[id number]]     Order Specific Question:   Previously tested for  COVID-19?     Answer:   Yes     Order Specific Question:   Employed in healthcare setting?     Answer:   No     Order Specific Question:   Symptomatic for COVID-19 as defined by CDC?     Answer:   No     Order Specific Question:   Hospitalized for COVID-19?     Answer:   No     Order Specific Question:   Admitted to ICU for COVID-19?     Answer:   No     Order Specific Question:   Resident in a congregate (group) care setting?     Answer:   No     Order Specific Question:   Pregnant?     Answer:   No     Order Specific Question:   Release to patient     Answer:   Immediate   • Comprehensive Metabolic Panel     Standing Status:   Future     Standing Expiration Date:   10/25/2022     Order Specific Question:   Release to patient     Answer:   Immediate   • CBC (No Diff)     Standing Status:   Future     Standing Expiration Date:   10/25/2022     Order Specific Question:   Release to patient     Answer:   Immediate   • Follow Anesthesia Guidelines / Standing Orders     Standing Status:   Future   • Obtain Informed Consent     Order Specific Question:   Informed Consent Given For     Answer:   ENDOSCOPIC FUNCTIONAL SINUS SURGERY W TRACKING, SINUPLASTY, SEPTOPLASTY, RESECT INFERIOR TURBINATES VIA COBLATION     Return in about 4 weeks (around 11/2/2021) for Recheck.       Patient Instructions   Risk benefits and options regarding bilateral Latera implant for nasal valve collapse were discussed with the patient she understands and wishes to proceed.  Resection with a small piece of cartilage at the alar base near the vestibule on the left will also be performed.    Also be scheduled in the near future.

## 2021-10-05 ENCOUNTER — OFFICE VISIT (OUTPATIENT)
Dept: OTOLARYNGOLOGY | Facility: CLINIC | Age: 36
End: 2021-10-05

## 2021-10-05 VITALS
WEIGHT: 149.2 LBS | DIASTOLIC BLOOD PRESSURE: 68 MMHG | HEIGHT: 65 IN | TEMPERATURE: 98 F | SYSTOLIC BLOOD PRESSURE: 100 MMHG | BODY MASS INDEX: 24.86 KG/M2 | HEART RATE: 68 BPM

## 2021-10-05 DIAGNOSIS — Z98.890 STATUS POST FUNCTIONAL ENDOSCOPIC SINUS SURGERY (FESS): Primary | ICD-10-CM

## 2021-10-05 DIAGNOSIS — J34.89 NASAL VALVE COLLAPSE: ICD-10-CM

## 2021-10-05 PROBLEM — M95.0 NASAL VALVE COLLAPSE: Status: ACTIVE | Noted: 2021-10-05

## 2021-10-05 PROCEDURE — 99213 OFFICE O/P EST LOW 20 MIN: CPT | Performed by: OTOLARYNGOLOGY

## 2021-10-05 PROCEDURE — 31231 NASAL ENDOSCOPY DX: CPT | Performed by: OTOLARYNGOLOGY

## 2021-10-05 NOTE — PROGRESS NOTES
PROCEDURE NOTE    Katie France    DATE OF PROCEDURE: 10/5/2021    PROCEDURE:   Bilateral Diagnostic Rigid Nasal Endoscopy    PREPROCEDURE DIAGNOSIS:   Status post septoplasty and inferior turbinate reduction with recurrent congestion left greater than right    POSTPROCEDURE DIAGNOSIS:  SAME    ANESTHESIA:   None    PROCEDURE DESCRIPTION:    With the patient in the chair bilateral diagnostic rigid nasal endoscopy was performed with the Stortz 0° endoscope without difficulty.     An endoscope was passed along the left nasal floor to the nasopharynx.  It was then passed into the region of the middle meatus, middle turbinate, and the sphenoethmoid region. An identical procedure was performed on the right side.  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  No intranasal polyposis is appreciated there is a small cartilage remnant at the base of the vestibule on the left.  Right-sided nasal valve collapse is also noted with occlusion of the left and no intranasal polyposis appreciated    Condition:  Stable.  Patient tolerated procedure well.    Complications:  None  There was no significant bleeding.

## 2021-10-05 NOTE — PATIENT INSTRUCTIONS
Risk benefits and options regarding bilateral Latera implant for nasal valve collapse were discussed with the patient she understands and wishes to proceed.  Resection with a small piece of cartilage at the alar base near the vestibule on the left will also be performed.    Also be scheduled in the near future.

## 2021-11-22 ENCOUNTER — PRE-ADMISSION TESTING (OUTPATIENT)
Dept: PREADMISSION TESTING | Facility: HOSPITAL | Age: 36
End: 2021-11-22

## 2021-11-22 ENCOUNTER — LAB (OUTPATIENT)
Dept: LAB | Facility: HOSPITAL | Age: 36
End: 2021-11-22

## 2021-11-22 VITALS
BODY MASS INDEX: 25.49 KG/M2 | SYSTOLIC BLOOD PRESSURE: 124 MMHG | HEART RATE: 71 BPM | RESPIRATION RATE: 18 BRPM | DIASTOLIC BLOOD PRESSURE: 74 MMHG | WEIGHT: 153 LBS | OXYGEN SATURATION: 100 % | HEIGHT: 65 IN

## 2021-11-22 DIAGNOSIS — Z98.890 STATUS POST FUNCTIONAL ENDOSCOPIC SINUS SURGERY (FESS): ICD-10-CM

## 2021-11-22 DIAGNOSIS — J34.89 NASAL VALVE COLLAPSE: ICD-10-CM

## 2021-11-22 LAB
ALBUMIN SERPL-MCNC: 4.7 G/DL (ref 3.5–5.2)
ALBUMIN/GLOB SERPL: 2 G/DL
ALP SERPL-CCNC: 49 U/L (ref 39–117)
ALT SERPL W P-5'-P-CCNC: 9 U/L (ref 1–33)
ANION GAP SERPL CALCULATED.3IONS-SCNC: 8 MMOL/L (ref 5–15)
AST SERPL-CCNC: 15 U/L (ref 1–32)
BILIRUB SERPL-MCNC: 0.2 MG/DL (ref 0–1.2)
BUN SERPL-MCNC: 11 MG/DL (ref 6–20)
BUN/CREAT SERPL: 15.9 (ref 7–25)
CALCIUM SPEC-SCNC: 9.2 MG/DL (ref 8.6–10.5)
CHLORIDE SERPL-SCNC: 104 MMOL/L (ref 98–107)
CO2 SERPL-SCNC: 28 MMOL/L (ref 22–29)
CREAT SERPL-MCNC: 0.69 MG/DL (ref 0.57–1)
DEPRECATED RDW RBC AUTO: 40.6 FL (ref 37–54)
ERYTHROCYTE [DISTWIDTH] IN BLOOD BY AUTOMATED COUNT: 11.9 % (ref 12.3–15.4)
GFR SERPL CREATININE-BSD FRML MDRD: 97 ML/MIN/1.73
GLOBULIN UR ELPH-MCNC: 2.4 GM/DL
GLUCOSE SERPL-MCNC: 86 MG/DL (ref 65–99)
HCT VFR BLD AUTO: 40 % (ref 34–46.6)
HGB BLD-MCNC: 13 G/DL (ref 12–15.9)
MCH RBC QN AUTO: 30.1 PG (ref 26.6–33)
MCHC RBC AUTO-ENTMCNC: 32.5 G/DL (ref 31.5–35.7)
MCV RBC AUTO: 92.6 FL (ref 79–97)
PLATELET # BLD AUTO: 221 10*3/MM3 (ref 140–450)
PMV BLD AUTO: 10.5 FL (ref 6–12)
POTASSIUM SERPL-SCNC: 4.2 MMOL/L (ref 3.5–5.2)
PROT SERPL-MCNC: 7.1 G/DL (ref 6–8.5)
RBC # BLD AUTO: 4.32 10*6/MM3 (ref 3.77–5.28)
SARS-COV-2 ORF1AB RESP QL NAA+PROBE: NOT DETECTED
SODIUM SERPL-SCNC: 140 MMOL/L (ref 136–145)
WBC NRBC COR # BLD: 5.74 10*3/MM3 (ref 3.4–10.8)

## 2021-11-22 PROCEDURE — U0004 COV-19 TEST NON-CDC HGH THRU: HCPCS

## 2021-11-22 PROCEDURE — 80053 COMPREHEN METABOLIC PANEL: CPT

## 2021-11-22 PROCEDURE — C9803 HOPD COVID-19 SPEC COLLECT: HCPCS

## 2021-11-22 PROCEDURE — 36415 COLL VENOUS BLD VENIPUNCTURE: CPT

## 2021-11-22 PROCEDURE — 85027 COMPLETE CBC AUTOMATED: CPT

## 2021-11-22 RX ORDER — LEVOTHYROXINE SODIUM 0.05 MG/1
50 TABLET ORAL DAILY
COMMUNITY

## 2021-11-22 NOTE — DISCHARGE INSTRUCTIONS
DAY OF SURGERY INSTRUCTIONS          ARRIVAL TIME: AS DIRECTED BY OFFICE    YOU MAY TAKE THE FOLLOWING MEDICATION(S) THE MORNING OF SURGERY WITH A SIP OF WATER: ***    ALL OTHER HOME MEDICATIONS CHECK WITH YOUR DOCTOR    DO NOT TAKE ANY ERECTILE DYSFUNCTION MEDICATIONS (EX:  CIALIS, VIAGRA) 24 HOURS PRIOR TO SURGERY              MANAGING PAIN AFTER SURGERY    We know you are probably wondering what your pain will be like after surgery.  Following surgery it is unrealistic to expect you will not have pain.   Pain is how our bodies let us know that something is wrong or cautions us to be careful.  That said, our goal is to make your pain tolerable.    Methods we may use to treat your pain include (oral or IV medications, PCAs, epidurals, nerve blocks, etc.)   While some procedures require IV pain medications for a short time after surgery, transitioning to pain medications by mouth allows for better management of pain.   Your nurse will encourage you to take oral pain medications whenever possible.  IV medications work almost immediately, but only last a short while.  Taking medications by mouth allows for a more constant level of medication in your blood stream for a longer period of time.      Once your pain is out of control it is harder to get back under control.  It is important you are aware when your next dose of pain medication is due.  If you are admitted, your nurse may write the time of your next dose on the white board in your room to help you remember.      We are interested in your pain and encourage you to inform us about aggravating factors during your visit.   Many times a simple repositioning every few hours can make a big difference.    If your physician says it is okay, do not let your pain prevent you from getting out of bed. Be sure to call your nurse for assistance prior to getting up so you do not fall.      Before surgery, please decide your tolerable pain goal.  These faces help describe the  pain ratings we use on a 0-10 scale.   Be prepared to tell us your goal and whether or not you take pain or anxiety medications at home.      BEFORE YOU COME TO THE HOSPITAL  (Pre-op instructions)  • Do not eat, drink, smoke or chew gum after midnight the night before surgery.  This also includes no mints.  • Morning of surgery take only the medicines you have been instructed with a sip of water unless otherwise instructed  by your physician.  • Do not shave, wear makeup or dark nail polish.  • Remove all jewelry including rings.  • Leave anything you consider valuable at home.  • Leave your suitcase in the car until after your surgery.  • Bring the following with you if applicable:  o Picture ID and insurance, Medicare or Medicaid cards  o Co-pay/deductible required by insurance (cash, check, credit card)  o Copy of advance directive, living will or power-of- documents if not brought to Pre-work  o CPAP or BIPAP mask and tubing  o Relaxation aids ( book, magazine), etc.  o Hearing aids                                 ON THE DAY OF SURGERY  · On the day of surgery check in at registration located at the main entrance of the hospital. Only one family member or friend are allowed per patient.  ? You will be registered and given a beeper with instructions where to wait in the main lobby.  ? When your beeper lights up and vibrates a member of the Outpatient Surgery staff will meet you at the double doors under the stair steps and escort you to your preoperative room.   · You may have cloth compression devices placed on your legs. These help to prevent blood clots and reduce swelling in your legs.  · An IV may be inserted into one of your veins.  · In the operating room, you may be given one or more of the following:  ? A medicine to help you relax (sedative).  ? A medicine to numb the area (local anesthetic).  ? A medicine to make you fall asleep (general anesthetic).  ? A medicine that is injected into an area  "of your body to numb everything below the injection site (regional anesthetic).  · Your surgical site will be marked or identified.  · You may be given an antibiotic through your IV to help prevent infection.  Contact a health care provider if you:  · Develop a fever of more than 100.4°F (38°C) or other feelings of illness during the 48 hours before your surgery.  · Have symptoms that get worse.  Have questions or concerns about your surgery    General Anesthesia/Surgery, Adult  General anesthesia is the use of medicines to make a person \"go to sleep\" (unconscious) for a medical procedure. General anesthesia must be used for certain procedures, and is often recommended for procedures that:  · Last a long time.  · Require you to be still or in an unusual position.  · Are major and can cause blood loss.  The medicines used for general anesthesia are called general anesthetics. As well as making you unconscious for a certain amount of time, these medicines:  · Prevent pain.  · Control your blood pressure.  · Relax your muscles.  Tell a health care provider about:  · Any allergies you have.  · All medicines you are taking, including vitamins, herbs, eye drops, creams, and over-the-counter medicines.  · Any problems you or family members have had with anesthetic medicines.  · Types of anesthetics you have had in the past.  · Any blood disorders you have.  · Any surgeries you have had.  · Any medical conditions you have.  · Any recent upper respiratory, chest, or ear infections.  · Any history of:  ? Heart or lung conditions, such as heart failure, sleep apnea, asthma, or chronic obstructive pulmonary disease (COPD).  ?  service.  ? Depression or anxiety.  · Any tobacco or drug use, including marijuana or alcohol use.  · Whether you are pregnant or may be pregnant.  What are the risks?  Generally, this is a safe procedure. However, problems may occur, including:  · Allergic reaction.  · Lung and heart " problems.  · Inhaling food or liquid from the stomach into the lungs (aspiration).  · Nerve injury.  · Air in the bloodstream, which can lead to stroke.  · Extreme agitation or confusion (delirium) when you wake up from the anesthetic.  · Waking up during your procedure and being unable to move. This is rare.  These problems are more likely to develop if you are having a major surgery or if you have an advanced or serious medical condition. You can prevent some of these complications by answering all of your health care provider's questions thoroughly and by following all instructions before your procedure.  General anesthesia can cause side effects, including:  · Nausea or vomiting.  · A sore throat from the breathing tube.  · Hoarseness.  · Wheezing or coughing.  · Shaking chills.  · Tiredness.  · Body aches.  · Anxiety.  · Sleepiness or drowsiness.  · Confusion or agitation.  RISKS AND COMPLICATIONS OF SURGERY  Your health care provider will discuss possible risks and complications with you before surgery. Common risks and complications include:    · Problems due to the use of anesthetics.  · Blood loss and replacement (does not apply to minor surgical procedures).  · Temporary increase in pain due to surgery.  · Uncorrected pain or problems that the surgery was meant to correct.  · Infection.  · New damage.    What happens before the procedure?    Medicines  Ask your health care provider about:  · Changing or stopping your regular medicines. This is especially important if you are taking diabetes medicines or blood thinners.  · Taking medicines such as aspirin and ibuprofen. These medicines can thin your blood. Do not take these medicines unless your health care provider tells you to take them.  · Taking over-the-counter medicines, vitamins, herbs, and supplements. Do not take these during the week before your procedure unless your health care provider approves them.  General instructions  · Starting 3-6 weeks  before the procedure, do not use any products that contain nicotine or tobacco, such as cigarettes and e-cigarettes. If you need help quitting, ask your health care provider.  · If you brush your teeth on the morning of the procedure, make sure to spit out all of the toothpaste.  · Tell your health care provider if you become ill or develop a cold, cough, or fever.  · If instructed by your health care provider, bring your sleep apnea device with you on the day of your surgery (if applicable).  · Ask your health care provider if you will be going home the same day, the following day, or after a longer hospital stay.  ? Plan to have someone take you home from the hospital or clinic.  ? Plan to have a responsible adult care for you for at least 24 hours after you leave the hospital or clinic. This is important.  What happens during the procedure?  · You will be given anesthetics through both of the following:  ? A mask placed over your nose and mouth.  ? An IV in one of your veins.  · You may receive a medicine to help you relax (sedative).  · After you are unconscious, a breathing tube may be inserted down your throat to help you breathe. This will be removed before you wake up.  · An anesthesia specialist will stay with you throughout your procedure. He or she will:  ? Keep you comfortable and safe by continuing to give you medicines and adjusting the amount of medicine that you get.  ? Monitor your blood pressure, pulse, and oxygen levels to make sure that the anesthetics do not cause any problems.  The procedure may vary among health care providers and hospitals.  What happens after the procedure?  · Your blood pressure, temperature, heart rate, breathing rate, and blood oxygen level will be monitored until the medicines you were given have worn off.  · You will wake up in a recovery area. You may wake up slowly.  · If you feel anxious or agitated, you may be given medicine to help you calm down.  · If you will be  going home the same day, your health care provider may check to make sure you can walk, drink, and urinate.  · Your health care provider will treat any pain or side effects you have before you go home.  · Do not drive for 24 hours if you were given a sedative.  Summary  · General anesthesia is used to keep you still and prevent pain during a procedure.  · It is important to tell your healthcare provider about your medical history and any surgeries you have had, and previous experience with anesthesia.  · Follow your healthcare provider’s instructions about when to stop eating, drinking, or taking certain medicines before your procedure.  · Plan to have someone take you home from the hospital or clinic.  This information is not intended to replace advice given to you by your health care provider. Make sure you discuss any questions you have with your health care provider.  Document Released: 03/26/2009 Document Revised: 08/03/2018 Document Reviewed: 08/03/2018  [employer] Patient Education © 2019 [employer]      Fall Prevention in Hospitals, Adult  As a hospital patient, your condition and the treatments you receive can increase your risk for falls. Some additional risk factors for falls in a hospital include:  · Being in an unfamiliar environment.  · Being on bed rest.  · Your surgery.  · Taking certain medicines.  · Your tubing requirements, such as intravenous (IV) therapy or catheters.  It is important that you learn how to decrease fall risks while at the hospital. Below are important tips that can help prevent falls.  SAFETY TIPS FOR PREVENTING FALLS  Talk about your risk of falling.  · Ask your health care provider why you are at risk for falling. Is it your medicine, illness, tubing placement, or something else?  · Make a plan with your health care provider to keep you safe from falls.  · Ask your health care provider or pharmacist about side effects of your medicines. Some medicines can make you  dizzy or affect your coordination.  Ask for help.  · Ask for help before getting out of bed. You may need to press your call button.  · Ask for assistance in getting safely to the toilet.  · Ask for a walker or cane to be put at your bedside. Ask that most of the side rails on your bed be placed up before your health care provider leaves the room.  · Ask family or friends to sit with you.  · Ask for things that are out of your reach, such as your glasses, hearing aids, telephone, bedside table, or call button.  Follow these tips to avoid falling:  · Stay lying or seated, rather than standing, while waiting for help.  · Wear rubber-soled slippers or shoes whenever you walk in the hospital.  · Avoid quick, sudden movements.  ¨ Change positions slowly.  ¨ Sit on the side of your bed before standing.  ¨ Stand up slowly and wait before you start to walk.  · Let your health care provider know if there is a spill on the floor.  · Pay careful attention to the medical equipment, electrical cords, and tubes around you.  · When you need help, use your call button by your bed or in the bathroom. Wait for one of your health care providers to help you.  · If you feel dizzy or unsure of your footing, return to bed and wait for assistance.  · Avoid being distracted by the TV, telephone, or another person in your room.  · Do not lean or support yourself on rolling objects, such as IV poles or bedside tables.     This information is not intended to replace advice given to you by your health care provider. Make sure you discuss any questions you have with your health care provider.     Document Released: 12/15/2001 Document Revised: 01/08/2016 Document Reviewed: 08/25/2013  [employer] Interactive Patient Education ©2016 [employer]            PATIENT/FAMILY/RESPONSIBLE PARTY VERBALIZES UNDERSTANDING OF ABOVE EDUCATION.  COPY OF PAIN SCALE GIVEN AND REVIEWED WITH VERBALIZED UNDERSTANDING.

## 2023-05-11 ENCOUNTER — OFFICE VISIT (OUTPATIENT)
Dept: GASTROENTEROLOGY | Facility: CLINIC | Age: 38
End: 2023-05-11
Payer: COMMERCIAL

## 2023-05-11 VITALS
OXYGEN SATURATION: 98 % | SYSTOLIC BLOOD PRESSURE: 132 MMHG | HEART RATE: 79 BPM | WEIGHT: 145 LBS | BODY MASS INDEX: 24.75 KG/M2 | HEIGHT: 64 IN | TEMPERATURE: 97.3 F | DIASTOLIC BLOOD PRESSURE: 78 MMHG

## 2023-05-11 DIAGNOSIS — R10.84 GENERALIZED ABDOMINAL PAIN: ICD-10-CM

## 2023-05-11 DIAGNOSIS — R14.0 BLOATING: ICD-10-CM

## 2023-05-11 DIAGNOSIS — K59.00 CONSTIPATION, UNSPECIFIED CONSTIPATION TYPE: Primary | ICD-10-CM

## 2023-05-11 DIAGNOSIS — K58.1 IRRITABLE BOWEL SYNDROME WITH CONSTIPATION: ICD-10-CM

## 2023-05-11 DIAGNOSIS — R12 HEARTBURN: ICD-10-CM

## 2023-05-11 PROBLEM — K58.9 IBS (IRRITABLE BOWEL SYNDROME): Status: ACTIVE | Noted: 2023-05-11

## 2023-05-11 RX ORDER — MAGNESIUM OXIDE 400 MG/1
400 TABLET ORAL DAILY
COMMUNITY

## 2023-05-11 RX ORDER — LANOLIN ALCOHOL/MO/W.PET/CERES
1000 CREAM (GRAM) TOPICAL DAILY
COMMUNITY

## 2023-05-11 NOTE — PROGRESS NOTES
"Primary Physician: Jose Gallego MD    Chief Complaint   Patient presents with   • Alternating bowel habits     Pt states she will either be constipated or having diarrhea-at times her BM's are hard; Pt also states her BM's always smell like rotten eggs-is also very gassy and it also smells like rotten eggs    • Bloated     Pt c/o bloating/swelling \"for a long time\"-states at times she looks 8 months pregnant and is very uncomfortable    • Abdominal Pain     Pt also c/o abdominal pain \"for a long time\"        Subjective     Katie France is a 37 y.o. female.    HPI   IBS  She has long time hx IBS-C. Only has a BM every 3-4 days.  No blood in her stools  Feels like she has pressure in her rectum. Maybe can't empty her rectum.  Otherwise, the only 1 that has not got those back yet.  We will be reaching out to him and see if he will have them by the end of the day  No blood in her stools to report.    Bloating  Has abd bloating with or without food  No family hx colon cancer    FH colon polyps  Mother has colon has colon polyps.      Past Medical History:   Diagnosis Date   • Anxiety    • Bladder stones    • Breathing difficult    • COVID-19 vaccine series completed     Moderna   • Family history of colonic polyps    • Hashimoto's disease    • Headache    • Hypothyroidism    • Irritable bowel    • IUD (intrauterine device) in place    • Kidney stones    • Migraines    • PONV (postoperative nausea and vomiting)    • Snoring        Past Surgical History:   Procedure Laterality Date   • CHOLECYSTECTOMY     • ENDOSCOPIC FUNCTIONAL SINUS SURGERY (FESS) N/A 1/8/2021    Procedure: Septoplasty, bilateral inferior turbinate reduction via Coblation, resection of bilateral yony bullosa deformities via rigid nasal endoscopy;  Surgeon: Ahmet Kasper MD;  Location: [facility];  Service: ENT;  Laterality: N/A;   • NASAL ENDOSCOPY N/A 1/8/2021    Procedure: rigid nasal endoscopy;  Surgeon: Ahmet Kasper MD;  " "Location: [facility] OR;  Service: ENT;  Laterality: N/A;        Current Outpatient Medications:   •  FLUoxetine (PROzac) 20 MG capsule, Take 1 capsule by mouth Daily., Disp: , Rfl:   •  levothyroxine (SYNTHROID, LEVOTHROID) 100 MCG tablet, Take 1 tablet by mouth Daily., Disp: , Rfl:   •  magnesium oxide (MAG-OX) 400 MG tablet, Take 1 tablet by mouth Daily., Disp: , Rfl:   •  valACYclovir (VALTREX) 1000 MG tablet, Take 1 tablet by mouth As Needed., Disp: , Rfl:   •  vitamin B-12 (CYANOCOBALAMIN) 1000 MCG tablet, Take 1 tablet by mouth Daily., Disp: , Rfl:   •  linaclotide (Linzess) 145 MCG capsule capsule, Take 1 capsule by mouth Daily. Take >30 min before 1st meal, Disp: 30 capsule, Rfl: 11    Allergies   Allergen Reactions   • Amoxicillin Hives       Social History     Socioeconomic History   • Marital status:    Tobacco Use   • Smoking status: Never   • Smokeless tobacco: Never   Vaping Use   • Vaping Use: Never used   Substance and Sexual Activity   • Alcohol use: Not Currently   • Drug use: No   • Sexual activity: Defer       Family History   Problem Relation Age of Onset   • Heart attack Mother    • Hypertension Mother    • Colon polyps Mother         < 60 years of age   • Heart attack Father    • Colon cancer Neg Hx    • Esophageal cancer Neg Hx    • Liver cancer Neg Hx    • Stomach cancer Neg Hx    • Rectal cancer Neg Hx    • Liver disease Neg Hx        Review of Systems   Constitutional: Negative for unexpected weight change.   Respiratory: Negative for shortness of breath.    Cardiovascular: Negative for chest pain.       Objective     /78 (BP Location: Left arm, Patient Position: Sitting, Cuff Size: Adult)   Pulse 79   Temp 97.3 °F (36.3 °C) (Infrared)   Ht 162.6 cm (64\")   Wt 65.8 kg (145 lb)   SpO2 98%   Breastfeeding No   BMI 24.89 kg/m²     Physical Exam  Vitals reviewed.   Constitutional:       Appearance: Normal appearance.   Cardiovascular:      Rate and Rhythm: Normal rate and " regular rhythm.      Heart sounds: Normal heart sounds.   Pulmonary:      Effort: Pulmonary effort is normal.      Breath sounds: Normal breath sounds.   Neurological:      Mental Status: She is alert.         Lab Results - Last 18 Months   Lab Units 11/22/21  1323   GLUCOSE mg/dL 86   BUN mg/dL 11   CREATININE mg/dL 0.69   SODIUM mmol/L 140   POTASSIUM mmol/L 4.2   CHLORIDE mmol/L 104   CO2 mmol/L 28.0   TOTAL PROTEIN g/dL 7.1   ALBUMIN g/dL 4.70   ALT (SGPT) U/L 9   AST (SGOT) U/L 15   ALK PHOS U/L 49   BILIRUBIN mg/dL 0.2   GLOBULIN gm/dL 2.4       Lab Results - Last 18 Months   Lab Units 11/22/21  1323   HEMOGLOBIN g/dL 13.0   HEMATOCRIT % 40.0   MCV fL 92.6   WBC 10*3/mm3 5.74   RDW % 11.9*   MPV fL 10.5   PLATELETS 10*3/mm3 221             IMPRESSION/PLAN:    Assessment & Plan      Problem List Items Addressed This Visit        Gastrointestinal Abdominal     Bloating    Relevant Orders    Case Request (Completed)    IBS (irritable bowel syndrome)    Generalized abdominal pain    Overview     Generalized abdominal pain intermittent for years         Relevant Orders    Case Request (Completed)    Constipation - Primary    Current Assessment & Plan     Begin Linzess 145 mics daily         Relevant Medications    linaclotide (Linzess) 145 MCG capsule capsule    Other Relevant Orders    Case Request (Completed)    Heartburn    Current Assessment & Plan     ..Pt is instructed to avoid caffeine, chocolate, peppermint and nicotine.  They are to avoid eating within 2-3 hours prior to bedtime.              Endoscopy and Colonoscopy per Dr Bird  Mirminnie Prep    Linzess 145mcg daily    ..Pt is instructed to avoid caffeine, chocolate, peppermint and nicotine.  They are to avoid eating within 2-3 hours prior to bedtime.  Limit carbonated beverages            ..The risks, benefits, and alternatives of colonoscopy were reviewed with the patient today.  Risks including perforation of the colon possibly requiring surgery or  colostomy.  Additional risks include risk of bleeding from biopsies or removal of colon tissue.  There is also the risk of a drug reaction or problems with anesthesia.  This will be discussed with the further by the anesthesia team on the day of the procedure.  Lastly there is a possibility of missing a colon polyp or cancer.  The benefits include the diagnosis and management of disease of the colon and rectum.  Alternatives to colonoscopy include barium enema, laboratory testing, radiographic evaluation, or no intervention.  The patient verbalizes understanding and agrees.    In accordance with requirements under the Affordable Care Act, [facility] has provided pricing for all hospital services and items on each of its websites. However, a patient's actual cost may differ based on the services the patient receives to meet individual healthcare needs and based on the benefits provided under the patient’s insurance coverage.        Chelsie Tellez, APRN  05/12/23  10:49 CDT    Part of this note may be an electronic transcription/translation of spoken language to printed text.

## 2023-05-12 NOTE — ASSESSMENT & PLAN NOTE
..Pt is instructed to avoid caffeine, chocolate, peppermint and nicotine.  They are to avoid eating within 2-3 hours prior to bedtime.

## 2024-04-16 ENCOUNTER — TRANSCRIBE ORDERS (OUTPATIENT)
Dept: ADMINISTRATIVE | Facility: HOSPITAL | Age: 39
End: 2024-04-16
Payer: COMMERCIAL

## 2024-04-16 DIAGNOSIS — E03.8 SECONDARY HYPOTHYROIDISM: Primary | ICD-10-CM

## 2024-04-16 DIAGNOSIS — E06.3 HASHIMOTO'S DISEASE: ICD-10-CM

## (undated) DEVICE — COAGULATOR SXN MEGADYNE FT/CONTRL 10F 6IN

## (undated) DEVICE — NDL HYPO PRECISIONGLIDE/REG 18G 11/2 PNK

## (undated) DEVICE — GLV SURG BIOGEL M LTX PF 7 1/2

## (undated) DEVICE — PK TURNOVER RM ADV

## (undated) DEVICE — MAJOR DOUBLE BASIN W/GOWNS II: Brand: MEDLINE INDUSTRIES, INC.

## (undated) DEVICE — PK ENT HD AND NK 30

## (undated) DEVICE — SPONGE,NEURO,0.5"X3",XR,STRL,LF,10/PK: Brand: MEDLINE

## (undated) DEVICE — WIPE MEROCEL 3.625X3IN

## (undated) DEVICE — REFLEX ULTRA 45 WITH INTEGRATED CABLE: Brand: COBLATION

## (undated) DEVICE — TUBING, SUCTION, 1/4" X 12', STRAIGHT: Brand: MEDLINE

## (undated) DEVICE — KT ANTI FOG W/FLD AND SPNG

## (undated) DEVICE — SINU FOAM: Brand: SINU-FOAM

## (undated) DEVICE — TOWEL,OR,DSP,ST,BLUE,STD,4/PK,20PK/CS: Brand: MEDLINE

## (undated) DEVICE — SUT GUT PLN 4/0 P3 18IN 1644G